# Patient Record
Sex: FEMALE | Race: BLACK OR AFRICAN AMERICAN | NOT HISPANIC OR LATINO | Employment: UNEMPLOYED | URBAN - METROPOLITAN AREA
[De-identification: names, ages, dates, MRNs, and addresses within clinical notes are randomized per-mention and may not be internally consistent; named-entity substitution may affect disease eponyms.]

---

## 2023-12-26 ENCOUNTER — ANESTHESIA EVENT (OUTPATIENT)
Dept: SURGERY | Facility: CLINIC | Age: 28
DRG: 982 | End: 2023-12-26

## 2023-12-26 ENCOUNTER — ANESTHESIA (OUTPATIENT)
Dept: SURGERY | Facility: CLINIC | Age: 28
DRG: 982 | End: 2023-12-26

## 2023-12-26 ENCOUNTER — MEDICAL CORRESPONDENCE (OUTPATIENT)
Dept: HEALTH INFORMATION MANAGEMENT | Facility: CLINIC | Age: 28
End: 2023-12-26

## 2023-12-26 ENCOUNTER — HOSPITAL ENCOUNTER (INPATIENT)
Facility: CLINIC | Age: 28
LOS: 5 days | Discharge: HOME HEALTH | DRG: 982 | End: 2023-12-31
Attending: EMERGENCY MEDICINE | Admitting: HOSPITALIST

## 2023-12-26 ENCOUNTER — APPOINTMENT (OUTPATIENT)
Dept: GENERAL RADIOLOGY | Facility: CLINIC | Age: 28
DRG: 982 | End: 2023-12-26
Attending: PHYSICIAN ASSISTANT

## 2023-12-26 DIAGNOSIS — L02.519 HAND ABSCESS: ICD-10-CM

## 2023-12-26 LAB
ANION GAP SERPL CALCULATED.3IONS-SCNC: 12 MMOL/L (ref 7–15)
ANION GAP SERPL CALCULATED.3IONS-SCNC: 12 MMOL/L (ref 7–15)
ATRIAL RATE - MUSE: 67 BPM
BASOPHILS # BLD AUTO: 0 10E3/UL (ref 0–0.2)
BASOPHILS # BLD AUTO: 0 10E3/UL (ref 0–0.2)
BASOPHILS NFR BLD AUTO: 0 %
BASOPHILS NFR BLD AUTO: 0 %
BUN SERPL-MCNC: 10.2 MG/DL (ref 6–20)
BUN SERPL-MCNC: 8.5 MG/DL (ref 6–20)
CALCIUM SERPL-MCNC: 8.8 MG/DL (ref 8.6–10)
CALCIUM SERPL-MCNC: 9.5 MG/DL (ref 8.6–10)
CHLORIDE SERPL-SCNC: 104 MMOL/L (ref 98–107)
CHLORIDE SERPL-SCNC: 108 MMOL/L (ref 98–107)
CREAT SERPL-MCNC: 0.53 MG/DL (ref 0.51–0.95)
CREAT SERPL-MCNC: 0.56 MG/DL (ref 0.51–0.95)
CRP SERPL-MCNC: 60.3 MG/L
DEPRECATED HCO3 PLAS-SCNC: 21 MMOL/L (ref 22–29)
DEPRECATED HCO3 PLAS-SCNC: 22 MMOL/L (ref 22–29)
DIASTOLIC BLOOD PRESSURE - MUSE: NORMAL MMHG
EGFRCR SERPLBLD CKD-EPI 2021: >90 ML/MIN/1.73M2
EGFRCR SERPLBLD CKD-EPI 2021: >90 ML/MIN/1.73M2
EOSINOPHIL # BLD AUTO: 0 10E3/UL (ref 0–0.7)
EOSINOPHIL # BLD AUTO: 0 10E3/UL (ref 0–0.7)
EOSINOPHIL NFR BLD AUTO: 0 %
EOSINOPHIL NFR BLD AUTO: 0 %
ERYTHROCYTE [DISTWIDTH] IN BLOOD BY AUTOMATED COUNT: 13.2 % (ref 10–15)
ERYTHROCYTE [DISTWIDTH] IN BLOOD BY AUTOMATED COUNT: 13.2 % (ref 10–15)
ERYTHROCYTE [SEDIMENTATION RATE] IN BLOOD BY WESTERGREN METHOD: 6 MM/HR (ref 0–20)
GLUCOSE SERPL-MCNC: 102 MG/DL (ref 70–99)
GLUCOSE SERPL-MCNC: 91 MG/DL (ref 70–99)
HCG SERPL QL: NEGATIVE
HCT VFR BLD AUTO: 39.4 % (ref 35–47)
HCT VFR BLD AUTO: 42.2 % (ref 35–47)
HGB BLD-MCNC: 13.2 G/DL (ref 11.7–15.7)
HGB BLD-MCNC: 14.2 G/DL (ref 11.7–15.7)
IMM GRANULOCYTES # BLD: 0 10E3/UL
IMM GRANULOCYTES # BLD: 0 10E3/UL
IMM GRANULOCYTES NFR BLD: 0 %
IMM GRANULOCYTES NFR BLD: 0 %
INTERPRETATION ECG - MUSE: NORMAL
LYMPHOCYTES # BLD AUTO: 2.1 10E3/UL (ref 0.8–5.3)
LYMPHOCYTES # BLD AUTO: 2.8 10E3/UL (ref 0.8–5.3)
LYMPHOCYTES NFR BLD AUTO: 25 %
LYMPHOCYTES NFR BLD AUTO: 29 %
MCH RBC QN AUTO: 30 PG (ref 26.5–33)
MCH RBC QN AUTO: 30.3 PG (ref 26.5–33)
MCHC RBC AUTO-ENTMCNC: 33.5 G/DL (ref 31.5–36.5)
MCHC RBC AUTO-ENTMCNC: 33.6 G/DL (ref 31.5–36.5)
MCV RBC AUTO: 89 FL (ref 78–100)
MCV RBC AUTO: 90 FL (ref 78–100)
MONOCYTES # BLD AUTO: 0.5 10E3/UL (ref 0–1.3)
MONOCYTES # BLD AUTO: 0.7 10E3/UL (ref 0–1.3)
MONOCYTES NFR BLD AUTO: 6 %
MONOCYTES NFR BLD AUTO: 7 %
NEUTROPHILS # BLD AUTO: 5.7 10E3/UL (ref 1.6–8.3)
NEUTROPHILS # BLD AUTO: 6.2 10E3/UL (ref 1.6–8.3)
NEUTROPHILS NFR BLD AUTO: 64 %
NEUTROPHILS NFR BLD AUTO: 69 %
NRBC # BLD AUTO: 0 10E3/UL
NRBC # BLD AUTO: 0 10E3/UL
NRBC BLD AUTO-RTO: 0 /100
NRBC BLD AUTO-RTO: 0 /100
P AXIS - MUSE: 75 DEGREES
PLATELET # BLD AUTO: 236 10E3/UL (ref 150–450)
PLATELET # BLD AUTO: 281 10E3/UL (ref 150–450)
POTASSIUM SERPL-SCNC: 3.4 MMOL/L (ref 3.4–5.3)
POTASSIUM SERPL-SCNC: 3.9 MMOL/L (ref 3.4–5.3)
PR INTERVAL - MUSE: 152 MS
QRS DURATION - MUSE: 92 MS
QT - MUSE: 410 MS
QTC - MUSE: 433 MS
R AXIS - MUSE: 51 DEGREES
RBC # BLD AUTO: 4.36 10E6/UL (ref 3.8–5.2)
RBC # BLD AUTO: 4.73 10E6/UL (ref 3.8–5.2)
SODIUM SERPL-SCNC: 138 MMOL/L (ref 135–145)
SODIUM SERPL-SCNC: 141 MMOL/L (ref 135–145)
SYSTOLIC BLOOD PRESSURE - MUSE: NORMAL MMHG
T AXIS - MUSE: 53 DEGREES
VENTRICULAR RATE- MUSE: 67 BPM
WBC # BLD AUTO: 8.4 10E3/UL (ref 4–11)
WBC # BLD AUTO: 9.7 10E3/UL (ref 4–11)

## 2023-12-26 PROCEDURE — 258N000003 HC RX IP 258 OP 636: Performed by: HOSPITALIST

## 2023-12-26 PROCEDURE — 258N000003 HC RX IP 258 OP 636: Performed by: EMERGENCY MEDICINE

## 2023-12-26 PROCEDURE — 96365 THER/PROPH/DIAG IV INF INIT: CPT

## 2023-12-26 PROCEDURE — 710N000009 HC RECOVERY PHASE 1, LEVEL 1, PER MIN: Performed by: ORTHOPAEDIC SURGERY

## 2023-12-26 PROCEDURE — 250N000011 HC RX IP 250 OP 636: Performed by: INTERNAL MEDICINE

## 2023-12-26 PROCEDURE — 250N000025 HC SEVOFLURANE, PER MIN: Performed by: ORTHOPAEDIC SURGERY

## 2023-12-26 PROCEDURE — 250N000009 HC RX 250: Performed by: NURSE ANESTHETIST, CERTIFIED REGISTERED

## 2023-12-26 PROCEDURE — 85025 COMPLETE CBC W/AUTO DIFF WBC: CPT | Performed by: HOSPITALIST

## 2023-12-26 PROCEDURE — 250N000013 HC RX MED GY IP 250 OP 250 PS 637: Performed by: PHYSICIAN ASSISTANT

## 2023-12-26 PROCEDURE — 250N000011 HC RX IP 250 OP 636: Performed by: ORTHOPAEDIC SURGERY

## 2023-12-26 PROCEDURE — 36415 COLL VENOUS BLD VENIPUNCTURE: CPT | Performed by: PHYSICIAN ASSISTANT

## 2023-12-26 PROCEDURE — 250N000011 HC RX IP 250 OP 636: Performed by: EMERGENCY MEDICINE

## 2023-12-26 PROCEDURE — 250N000013 HC RX MED GY IP 250 OP 250 PS 637: Performed by: HOSPITALIST

## 2023-12-26 PROCEDURE — 80048 BASIC METABOLIC PNL TOTAL CA: CPT | Performed by: EMERGENCY MEDICINE

## 2023-12-26 PROCEDURE — 86140 C-REACTIVE PROTEIN: CPT | Performed by: PHYSICIAN ASSISTANT

## 2023-12-26 PROCEDURE — 258N000003 HC RX IP 258 OP 636: Performed by: NURSE ANESTHETIST, CERTIFIED REGISTERED

## 2023-12-26 PROCEDURE — 87070 CULTURE OTHR SPECIMN AEROBIC: CPT | Performed by: ORTHOPAEDIC SURGERY

## 2023-12-26 PROCEDURE — 250N000009 HC RX 250: Performed by: ORTHOPAEDIC SURGERY

## 2023-12-26 PROCEDURE — 0R9W00Z DRAINAGE OF RIGHT FINGER PHALANGEAL JOINT WITH DRAINAGE DEVICE, OPEN APPROACH: ICD-10-PCS | Performed by: ORTHOPAEDIC SURGERY

## 2023-12-26 PROCEDURE — 36415 COLL VENOUS BLD VENIPUNCTURE: CPT | Performed by: HOSPITALIST

## 2023-12-26 PROCEDURE — 87102 FUNGUS ISOLATION CULTURE: CPT | Performed by: ORTHOPAEDIC SURGERY

## 2023-12-26 PROCEDURE — 120N000001 HC R&B MED SURG/OB

## 2023-12-26 PROCEDURE — 99285 EMERGENCY DEPT VISIT HI MDM: CPT | Mod: 25

## 2023-12-26 PROCEDURE — 999N000141 HC STATISTIC PRE-PROCEDURE NURSING ASSESSMENT: Performed by: ORTHOPAEDIC SURGERY

## 2023-12-26 PROCEDURE — 0M9700Z DRAINAGE OF RIGHT HAND BURSA AND LIGAMENT WITH DRAINAGE DEVICE, OPEN APPROACH: ICD-10-PCS | Performed by: ORTHOPAEDIC SURGERY

## 2023-12-26 PROCEDURE — 370N000017 HC ANESTHESIA TECHNICAL FEE, PER MIN: Performed by: ORTHOPAEDIC SURGERY

## 2023-12-26 PROCEDURE — 360N000076 HC SURGERY LEVEL 3, PER MIN: Performed by: ORTHOPAEDIC SURGERY

## 2023-12-26 PROCEDURE — 80048 BASIC METABOLIC PNL TOTAL CA: CPT | Performed by: HOSPITALIST

## 2023-12-26 PROCEDURE — 258N000003 HC RX IP 258 OP 636: Performed by: PHYSICIAN ASSISTANT

## 2023-12-26 PROCEDURE — 250N000011 HC RX IP 250 OP 636: Performed by: HOSPITALIST

## 2023-12-26 PROCEDURE — 250N000011 HC RX IP 250 OP 636: Performed by: NURSE ANESTHETIST, CERTIFIED REGISTERED

## 2023-12-26 PROCEDURE — 250N000013 HC RX MED GY IP 250 OP 250 PS 637: Performed by: EMERGENCY MEDICINE

## 2023-12-26 PROCEDURE — 258N000003 HC RX IP 258 OP 636: Performed by: INTERNAL MEDICINE

## 2023-12-26 PROCEDURE — 96375 TX/PRO/DX INJ NEW DRUG ADDON: CPT

## 2023-12-26 PROCEDURE — C9113 INJ PANTOPRAZOLE SODIUM, VIA: HCPCS | Performed by: HOSPITALIST

## 2023-12-26 PROCEDURE — 99222 1ST HOSP IP/OBS MODERATE 55: CPT | Performed by: HOSPITALIST

## 2023-12-26 PROCEDURE — 87075 CULTR BACTERIA EXCEPT BLOOD: CPT | Performed by: ORTHOPAEDIC SURGERY

## 2023-12-26 PROCEDURE — 999N000128 HC STATISTIC PERIPHERAL IV START W/O US GUIDANCE

## 2023-12-26 PROCEDURE — 84703 CHORIONIC GONADOTROPIN ASSAY: CPT | Performed by: EMERGENCY MEDICINE

## 2023-12-26 PROCEDURE — 99254 IP/OBS CNSLTJ NEW/EST MOD 60: CPT | Performed by: INTERNAL MEDICINE

## 2023-12-26 PROCEDURE — 0R9U00Z DRAINAGE OF RIGHT METACARPOPHALANGEAL JOINT WITH DRAINAGE DEVICE, OPEN APPROACH: ICD-10-PCS | Performed by: ORTHOPAEDIC SURGERY

## 2023-12-26 PROCEDURE — 73130 X-RAY EXAM OF HAND: CPT | Mod: RT

## 2023-12-26 PROCEDURE — 272N000001 HC OR GENERAL SUPPLY STERILE: Performed by: ORTHOPAEDIC SURGERY

## 2023-12-26 PROCEDURE — 85652 RBC SED RATE AUTOMATED: CPT | Performed by: PHYSICIAN ASSISTANT

## 2023-12-26 PROCEDURE — 87040 BLOOD CULTURE FOR BACTERIA: CPT | Performed by: PHYSICIAN ASSISTANT

## 2023-12-26 PROCEDURE — 36415 COLL VENOUS BLD VENIPUNCTURE: CPT | Performed by: EMERGENCY MEDICINE

## 2023-12-26 PROCEDURE — 85004 AUTOMATED DIFF WBC COUNT: CPT | Performed by: EMERGENCY MEDICINE

## 2023-12-26 PROCEDURE — 93005 ELECTROCARDIOGRAM TRACING: CPT

## 2023-12-26 RX ORDER — BUPIVACAINE HYDROCHLORIDE AND EPINEPHRINE 5; 5 MG/ML; UG/ML
INJECTION, SOLUTION PERINEURAL PRN
Status: DISCONTINUED | OUTPATIENT
Start: 2023-12-26 | End: 2023-12-26 | Stop reason: HOSPADM

## 2023-12-26 RX ORDER — NALOXONE HYDROCHLORIDE 0.4 MG/ML
0.4 INJECTION, SOLUTION INTRAMUSCULAR; INTRAVENOUS; SUBCUTANEOUS
Status: DISCONTINUED | OUTPATIENT
Start: 2023-12-26 | End: 2023-12-31 | Stop reason: HOSPADM

## 2023-12-26 RX ORDER — BISACODYL 10 MG
10 SUPPOSITORY, RECTAL RECTAL DAILY PRN
Status: DISCONTINUED | OUTPATIENT
Start: 2023-12-26 | End: 2023-12-31 | Stop reason: HOSPADM

## 2023-12-26 RX ORDER — CALCIUM CARBONATE 500 MG/1
1000 TABLET, CHEWABLE ORAL 4 TIMES DAILY PRN
Status: DISCONTINUED | OUTPATIENT
Start: 2023-12-26 | End: 2023-12-31 | Stop reason: HOSPADM

## 2023-12-26 RX ORDER — CEFAZOLIN SODIUM 1 G/3ML
1 INJECTION, POWDER, FOR SOLUTION INTRAMUSCULAR; INTRAVENOUS EVERY 8 HOURS
Status: CANCELLED | OUTPATIENT
Start: 2023-12-26 | End: 2023-12-27

## 2023-12-26 RX ORDER — NALOXONE HYDROCHLORIDE 0.4 MG/ML
0.2 INJECTION, SOLUTION INTRAMUSCULAR; INTRAVENOUS; SUBCUTANEOUS
Status: DISCONTINUED | OUTPATIENT
Start: 2023-12-26 | End: 2023-12-31 | Stop reason: HOSPADM

## 2023-12-26 RX ORDER — GINSENG 100 MG
CAPSULE ORAL
Status: DISCONTINUED
Start: 2023-12-26 | End: 2023-12-26 | Stop reason: HOSPADM

## 2023-12-26 RX ORDER — ONDANSETRON 4 MG/1
4 TABLET, ORALLY DISINTEGRATING ORAL EVERY 30 MIN PRN
Status: DISCONTINUED | OUTPATIENT
Start: 2023-12-26 | End: 2023-12-26

## 2023-12-26 RX ORDER — PROPOFOL 10 MG/ML
INJECTION, EMULSION INTRAVENOUS PRN
Status: DISCONTINUED | OUTPATIENT
Start: 2023-12-26 | End: 2023-12-26

## 2023-12-26 RX ORDER — POLYETHYLENE GLYCOL 3350 17 G/17G
17 POWDER, FOR SOLUTION ORAL 2 TIMES DAILY PRN
Status: DISCONTINUED | OUTPATIENT
Start: 2023-12-26 | End: 2023-12-31 | Stop reason: HOSPADM

## 2023-12-26 RX ORDER — AMOXICILLIN 250 MG
2 CAPSULE ORAL 2 TIMES DAILY PRN
Status: DISCONTINUED | OUTPATIENT
Start: 2023-12-26 | End: 2023-12-31 | Stop reason: HOSPADM

## 2023-12-26 RX ORDER — ONDANSETRON 2 MG/ML
4 INJECTION INTRAMUSCULAR; INTRAVENOUS EVERY 6 HOURS PRN
Status: DISCONTINUED | OUTPATIENT
Start: 2023-12-26 | End: 2023-12-31 | Stop reason: HOSPADM

## 2023-12-26 RX ORDER — LIDOCAINE 40 MG/G
CREAM TOPICAL
Status: DISCONTINUED | OUTPATIENT
Start: 2023-12-26 | End: 2023-12-31 | Stop reason: HOSPADM

## 2023-12-26 RX ORDER — ONDANSETRON 4 MG/1
4 TABLET, ORALLY DISINTEGRATING ORAL EVERY 6 HOURS PRN
Status: DISCONTINUED | OUTPATIENT
Start: 2023-12-26 | End: 2023-12-26

## 2023-12-26 RX ORDER — SODIUM CHLORIDE, SODIUM LACTATE, POTASSIUM CHLORIDE, CALCIUM CHLORIDE 600; 310; 30; 20 MG/100ML; MG/100ML; MG/100ML; MG/100ML
INJECTION, SOLUTION INTRAVENOUS CONTINUOUS
Status: DISCONTINUED | OUTPATIENT
Start: 2023-12-26 | End: 2023-12-29

## 2023-12-26 RX ORDER — ACETAMINOPHEN 325 MG/1
325-650 TABLET ORAL EVERY 6 HOURS PRN
Status: ON HOLD | COMMUNITY
End: 2023-12-29

## 2023-12-26 RX ORDER — SODIUM CHLORIDE, SODIUM LACTATE, POTASSIUM CHLORIDE, CALCIUM CHLORIDE 600; 310; 30; 20 MG/100ML; MG/100ML; MG/100ML; MG/100ML
INJECTION, SOLUTION INTRAVENOUS CONTINUOUS
Status: DISCONTINUED | OUTPATIENT
Start: 2023-12-26 | End: 2023-12-26

## 2023-12-26 RX ORDER — BUPIVACAINE HYDROCHLORIDE 5 MG/ML
INJECTION, SOLUTION EPIDURAL; INTRACAUDAL
Status: DISCONTINUED
Start: 2023-12-26 | End: 2023-12-26 | Stop reason: HOSPADM

## 2023-12-26 RX ORDER — OXYCODONE HYDROCHLORIDE 5 MG/1
10 TABLET ORAL EVERY 4 HOURS PRN
Status: DISCONTINUED | OUTPATIENT
Start: 2023-12-26 | End: 2023-12-27

## 2023-12-26 RX ORDER — ONDANSETRON 2 MG/ML
INJECTION INTRAMUSCULAR; INTRAVENOUS PRN
Status: DISCONTINUED | OUTPATIENT
Start: 2023-12-26 | End: 2023-12-26

## 2023-12-26 RX ORDER — HYDROMORPHONE HCL IN WATER/PF 6 MG/30 ML
0.2 PATIENT CONTROLLED ANALGESIA SYRINGE INTRAVENOUS EVERY 5 MIN PRN
Status: DISCONTINUED | OUTPATIENT
Start: 2023-12-26 | End: 2023-12-26

## 2023-12-26 RX ORDER — DEXAMETHASONE SODIUM PHOSPHATE 4 MG/ML
INJECTION, SOLUTION INTRA-ARTICULAR; INTRALESIONAL; INTRAMUSCULAR; INTRAVENOUS; SOFT TISSUE PRN
Status: DISCONTINUED | OUTPATIENT
Start: 2023-12-26 | End: 2023-12-26

## 2023-12-26 RX ORDER — AMOXICILLIN 250 MG
1 CAPSULE ORAL 2 TIMES DAILY PRN
Status: DISCONTINUED | OUTPATIENT
Start: 2023-12-26 | End: 2023-12-31 | Stop reason: HOSPADM

## 2023-12-26 RX ORDER — FENTANYL CITRATE 50 UG/ML
INJECTION, SOLUTION INTRAMUSCULAR; INTRAVENOUS PRN
Status: DISCONTINUED | OUTPATIENT
Start: 2023-12-26 | End: 2023-12-26

## 2023-12-26 RX ORDER — HYDROMORPHONE HCL IN WATER/PF 6 MG/30 ML
0.2 PATIENT CONTROLLED ANALGESIA SYRINGE INTRAVENOUS
Status: DISCONTINUED | OUTPATIENT
Start: 2023-12-26 | End: 2023-12-31 | Stop reason: HOSPADM

## 2023-12-26 RX ORDER — PROCHLORPERAZINE MALEATE 10 MG
10 TABLET ORAL EVERY 6 HOURS PRN
Status: DISCONTINUED | OUTPATIENT
Start: 2023-12-26 | End: 2023-12-31 | Stop reason: HOSPADM

## 2023-12-26 RX ORDER — AMOXICILLIN 250 MG
1 CAPSULE ORAL 2 TIMES DAILY
Status: DISCONTINUED | OUTPATIENT
Start: 2023-12-26 | End: 2023-12-31 | Stop reason: HOSPADM

## 2023-12-26 RX ORDER — POLYETHYLENE GLYCOL 3350 17 G/17G
17 POWDER, FOR SOLUTION ORAL DAILY
Status: DISCONTINUED | OUTPATIENT
Start: 2023-12-27 | End: 2023-12-31 | Stop reason: HOSPADM

## 2023-12-26 RX ORDER — ACETAMINOPHEN 650 MG/1
650 SUPPOSITORY RECTAL EVERY 4 HOURS PRN
Status: DISCONTINUED | OUTPATIENT
Start: 2023-12-26 | End: 2023-12-31 | Stop reason: HOSPADM

## 2023-12-26 RX ORDER — EPINEPHRINE 1 MG/ML
INJECTION, SOLUTION INTRAMUSCULAR; SUBCUTANEOUS
Status: DISCONTINUED
Start: 2023-12-26 | End: 2023-12-26 | Stop reason: HOSPADM

## 2023-12-26 RX ORDER — ESOMEPRAZOLE MAGNESIUM 40 MG/1
40 CAPSULE, DELAYED RELEASE ORAL DAILY PRN
Status: ON HOLD | COMMUNITY
End: 2023-12-31

## 2023-12-26 RX ORDER — HYDROMORPHONE HCL IN WATER/PF 6 MG/30 ML
0.4 PATIENT CONTROLLED ANALGESIA SYRINGE INTRAVENOUS
Status: DISCONTINUED | OUTPATIENT
Start: 2023-12-26 | End: 2023-12-31 | Stop reason: HOSPADM

## 2023-12-26 RX ORDER — AMPICILLIN AND SULBACTAM 2; 1 G/1; G/1
3 INJECTION, POWDER, FOR SOLUTION INTRAMUSCULAR; INTRAVENOUS EVERY 6 HOURS
Status: DISCONTINUED | OUTPATIENT
Start: 2023-12-26 | End: 2023-12-29

## 2023-12-26 RX ORDER — SODIUM CHLORIDE 9 MG/ML
INJECTION, SOLUTION INTRAVENOUS CONTINUOUS
Status: DISCONTINUED | OUTPATIENT
Start: 2023-12-26 | End: 2023-12-29

## 2023-12-26 RX ORDER — ASPIRIN 81 MG/1
81 TABLET ORAL 2 TIMES DAILY
Status: DISCONTINUED | OUTPATIENT
Start: 2023-12-27 | End: 2023-12-31 | Stop reason: HOSPADM

## 2023-12-26 RX ORDER — FENTANYL CITRATE 0.05 MG/ML
25 INJECTION, SOLUTION INTRAMUSCULAR; INTRAVENOUS EVERY 5 MIN PRN
Status: DISCONTINUED | OUTPATIENT
Start: 2023-12-26 | End: 2023-12-26

## 2023-12-26 RX ORDER — CEFAZOLIN SODIUM/WATER 2 G/20 ML
2 SYRINGE (ML) INTRAVENOUS
Status: DISCONTINUED | OUTPATIENT
Start: 2023-12-26 | End: 2023-12-26 | Stop reason: HOSPADM

## 2023-12-26 RX ORDER — GINSENG 100 MG
CAPSULE ORAL PRN
Status: DISCONTINUED | OUTPATIENT
Start: 2023-12-26 | End: 2023-12-26 | Stop reason: HOSPADM

## 2023-12-26 RX ORDER — ACETAMINOPHEN 325 MG/1
650 TABLET ORAL EVERY 4 HOURS PRN
Status: DISCONTINUED | OUTPATIENT
Start: 2023-12-26 | End: 2023-12-31 | Stop reason: HOSPADM

## 2023-12-26 RX ORDER — HYDROXYZINE HYDROCHLORIDE 25 MG/1
25 TABLET, FILM COATED ORAL EVERY 6 HOURS PRN
Status: DISCONTINUED | OUTPATIENT
Start: 2023-12-26 | End: 2023-12-31 | Stop reason: HOSPADM

## 2023-12-26 RX ORDER — KETOROLAC TROMETHAMINE 30 MG/ML
INJECTION, SOLUTION INTRAMUSCULAR; INTRAVENOUS PRN
Status: DISCONTINUED | OUTPATIENT
Start: 2023-12-26 | End: 2023-12-26

## 2023-12-26 RX ORDER — LIDOCAINE HYDROCHLORIDE 20 MG/ML
INJECTION, SOLUTION INFILTRATION; PERINEURAL PRN
Status: DISCONTINUED | OUTPATIENT
Start: 2023-12-26 | End: 2023-12-26

## 2023-12-26 RX ORDER — LIDOCAINE 40 MG/G
CREAM TOPICAL
Status: DISCONTINUED | OUTPATIENT
Start: 2023-12-26 | End: 2023-12-29

## 2023-12-26 RX ORDER — ACETAMINOPHEN 500 MG
1000 TABLET ORAL ONCE
Status: COMPLETED | OUTPATIENT
Start: 2023-12-26 | End: 2023-12-26

## 2023-12-26 RX ORDER — MAGNESIUM HYDROXIDE 1200 MG/15ML
LIQUID ORAL PRN
Status: DISCONTINUED | OUTPATIENT
Start: 2023-12-26 | End: 2023-12-26 | Stop reason: HOSPADM

## 2023-12-26 RX ORDER — CEFAZOLIN SODIUM/WATER 2 G/20 ML
2 SYRINGE (ML) INTRAVENOUS SEE ADMIN INSTRUCTIONS
Status: DISCONTINUED | OUTPATIENT
Start: 2023-12-26 | End: 2023-12-26 | Stop reason: HOSPADM

## 2023-12-26 RX ORDER — ONDANSETRON 4 MG/1
4 TABLET, ORALLY DISINTEGRATING ORAL EVERY 6 HOURS PRN
Status: DISCONTINUED | OUTPATIENT
Start: 2023-12-26 | End: 2023-12-31 | Stop reason: HOSPADM

## 2023-12-26 RX ORDER — DIPHENHYDRAMINE HCL 25 MG
25 CAPSULE ORAL EVERY 6 HOURS PRN
Status: DISCONTINUED | OUTPATIENT
Start: 2023-12-26 | End: 2023-12-31 | Stop reason: HOSPADM

## 2023-12-26 RX ORDER — ONDANSETRON 2 MG/ML
4 INJECTION INTRAMUSCULAR; INTRAVENOUS EVERY 30 MIN PRN
Status: DISCONTINUED | OUTPATIENT
Start: 2023-12-26 | End: 2023-12-26

## 2023-12-26 RX ORDER — ONDANSETRON 2 MG/ML
4 INJECTION INTRAMUSCULAR; INTRAVENOUS EVERY 6 HOURS PRN
Status: DISCONTINUED | OUTPATIENT
Start: 2023-12-26 | End: 2023-12-26

## 2023-12-26 RX ORDER — OXYCODONE HYDROCHLORIDE 5 MG/1
5 TABLET ORAL EVERY 4 HOURS PRN
Status: DISCONTINUED | OUTPATIENT
Start: 2023-12-26 | End: 2023-12-27

## 2023-12-26 RX ORDER — HYDROMORPHONE HYDROCHLORIDE 1 MG/ML
0.5 INJECTION, SOLUTION INTRAMUSCULAR; INTRAVENOUS; SUBCUTANEOUS ONCE
Status: COMPLETED | OUTPATIENT
Start: 2023-12-26 | End: 2023-12-26

## 2023-12-26 RX ORDER — SODIUM CHLORIDE, SODIUM LACTATE, POTASSIUM CHLORIDE, CALCIUM CHLORIDE 600; 310; 30; 20 MG/100ML; MG/100ML; MG/100ML; MG/100ML
INJECTION, SOLUTION INTRAVENOUS CONTINUOUS PRN
Status: DISCONTINUED | OUTPATIENT
Start: 2023-12-26 | End: 2023-12-26

## 2023-12-26 RX ORDER — FENTANYL CITRATE 0.05 MG/ML
50 INJECTION, SOLUTION INTRAMUSCULAR; INTRAVENOUS EVERY 5 MIN PRN
Status: DISCONTINUED | OUTPATIENT
Start: 2023-12-26 | End: 2023-12-26

## 2023-12-26 RX ORDER — HYDROMORPHONE HCL IN WATER/PF 6 MG/30 ML
0.4 PATIENT CONTROLLED ANALGESIA SYRINGE INTRAVENOUS EVERY 5 MIN PRN
Status: DISCONTINUED | OUTPATIENT
Start: 2023-12-26 | End: 2023-12-26

## 2023-12-26 RX ADMIN — DOCUSATE SODIUM 50 MG AND SENNOSIDES 8.6 MG 1 TABLET: 8.6; 5 TABLET, FILM COATED ORAL at 23:45

## 2023-12-26 RX ADMIN — HYDROMORPHONE HYDROCHLORIDE 0.4 MG: 0.2 INJECTION, SOLUTION INTRAMUSCULAR; INTRAVENOUS; SUBCUTANEOUS at 13:59

## 2023-12-26 RX ADMIN — FENTANYL CITRATE 100 MCG: 50 INJECTION INTRAMUSCULAR; INTRAVENOUS at 19:23

## 2023-12-26 RX ADMIN — Medication 1 LOZENGE: at 23:13

## 2023-12-26 RX ADMIN — ONDANSETRON 4 MG: 2 INJECTION INTRAMUSCULAR; INTRAVENOUS at 19:57

## 2023-12-26 RX ADMIN — ACETAMINOPHEN 650 MG: 325 TABLET, FILM COATED ORAL at 10:54

## 2023-12-26 RX ADMIN — ACETAMINOPHEN 650 MG: 325 TABLET, FILM COATED ORAL at 16:30

## 2023-12-26 RX ADMIN — KETOROLAC TROMETHAMINE 30 MG: 30 INJECTION, SOLUTION INTRAMUSCULAR at 20:47

## 2023-12-26 RX ADMIN — SODIUM CHLORIDE, POTASSIUM CHLORIDE, SODIUM LACTATE AND CALCIUM CHLORIDE: 600; 310; 30; 20 INJECTION, SOLUTION INTRAVENOUS at 23:34

## 2023-12-26 RX ADMIN — SODIUM CHLORIDE, POTASSIUM CHLORIDE, SODIUM LACTATE AND CALCIUM CHLORIDE: 600; 310; 30; 20 INJECTION, SOLUTION INTRAVENOUS at 19:17

## 2023-12-26 RX ADMIN — VANCOMYCIN HYDROCHLORIDE 1250 MG: 10 INJECTION, POWDER, LYOPHILIZED, FOR SOLUTION INTRAVENOUS at 05:48

## 2023-12-26 RX ADMIN — OXYCODONE HYDROCHLORIDE 5 MG: 5 TABLET ORAL at 05:47

## 2023-12-26 RX ADMIN — SODIUM CHLORIDE: 9 INJECTION, SOLUTION INTRAVENOUS at 10:49

## 2023-12-26 RX ADMIN — DEXAMETHASONE SODIUM PHOSPHATE 4 MG: 4 INJECTION, SOLUTION INTRA-ARTICULAR; INTRALESIONAL; INTRAMUSCULAR; INTRAVENOUS; SOFT TISSUE at 19:23

## 2023-12-26 RX ADMIN — AMPICILLIN SODIUM AND SULBACTAM SODIUM 3 G: 2; 1 INJECTION, POWDER, FOR SOLUTION INTRAMUSCULAR; INTRAVENOUS at 13:58

## 2023-12-26 RX ADMIN — SODIUM CHLORIDE 1000 ML: 9 INJECTION, SOLUTION INTRAVENOUS at 05:21

## 2023-12-26 RX ADMIN — PROPOFOL 30 MG: 10 INJECTION, EMULSION INTRAVENOUS at 19:39

## 2023-12-26 RX ADMIN — SUCCINYLCHOLINE CHLORIDE 80 MG: 20 INJECTION, SOLUTION INTRAMUSCULAR; INTRAVENOUS; PARENTERAL at 19:23

## 2023-12-26 RX ADMIN — HYDROMORPHONE HYDROCHLORIDE 0.5 MG: 1 INJECTION, SOLUTION INTRAMUSCULAR; INTRAVENOUS; SUBCUTANEOUS at 19:56

## 2023-12-26 RX ADMIN — PROPOFOL 150 MCG/KG/MIN: 10 INJECTION, EMULSION INTRAVENOUS at 19:28

## 2023-12-26 RX ADMIN — DIPHENHYDRAMINE HYDROCHLORIDE 25 MG: 25 CAPSULE ORAL at 06:49

## 2023-12-26 RX ADMIN — HYDROMORPHONE HYDROCHLORIDE 0.2 MG: 0.2 INJECTION, SOLUTION INTRAMUSCULAR; INTRAVENOUS; SUBCUTANEOUS at 16:30

## 2023-12-26 RX ADMIN — PANTOPRAZOLE SODIUM 40 MG: 40 INJECTION, POWDER, FOR SOLUTION INTRAVENOUS at 06:48

## 2023-12-26 RX ADMIN — OXYCODONE HYDROCHLORIDE 5 MG: 5 TABLET ORAL at 15:41

## 2023-12-26 RX ADMIN — PROPOFOL 150 MG: 10 INJECTION, EMULSION INTRAVENOUS at 19:23

## 2023-12-26 RX ADMIN — HYDROMORPHONE HYDROCHLORIDE 0.5 MG: 1 INJECTION, SOLUTION INTRAMUSCULAR; INTRAVENOUS; SUBCUTANEOUS at 04:53

## 2023-12-26 RX ADMIN — ACETAMINOPHEN 1000 MG: 500 TABLET, FILM COATED ORAL at 04:52

## 2023-12-26 RX ADMIN — AMPICILLIN SODIUM AND SULBACTAM SODIUM 3 G: 2; 1 INJECTION, POWDER, FOR SOLUTION INTRAMUSCULAR; INTRAVENOUS at 20:19

## 2023-12-26 RX ADMIN — OXYCODONE HYDROCHLORIDE 5 MG: 5 TABLET ORAL at 10:44

## 2023-12-26 RX ADMIN — LIDOCAINE HYDROCHLORIDE 100 MG: 20 INJECTION, SOLUTION INFILTRATION; PERINEURAL at 19:23

## 2023-12-26 RX ADMIN — VANCOMYCIN HYDROCHLORIDE 1250 MG: 10 INJECTION, POWDER, LYOPHILIZED, FOR SOLUTION INTRAVENOUS at 17:46

## 2023-12-26 RX ADMIN — MIDAZOLAM 2 MG: 1 INJECTION INTRAMUSCULAR; INTRAVENOUS at 19:17

## 2023-12-26 RX ADMIN — PROPOFOL 70 MG: 10 INJECTION, EMULSION INTRAVENOUS at 19:42

## 2023-12-26 ASSESSMENT — ACTIVITIES OF DAILY LIVING (ADL)
ADLS_ACUITY_SCORE: 35
ADLS_ACUITY_SCORE: 18
ADLS_ACUITY_SCORE: 35
ADLS_ACUITY_SCORE: 18

## 2023-12-26 NOTE — ED PROVIDER NOTES
"  History     Chief Complaint:  Wound Check       HPI   Shweta Hilton is a 28 year old female who presents with pain, swelling, and redness at the base of the right second, index, finger.  She states that she noted a small area approximately 6 days ago, and it has continued to grow and spread and become more painful.  She has tried taking Tylenol with very minimal relief of her symptoms.  She has not had any fever, but does feel somewhat unwell generally.  She is not aware of any injuries, cuts, splinters, or other inciting factor.  She denies any prior history of this.        Allergies:  No Known Allergies     Medications:    No current outpatient medications on file.      Past Medical History:    Past Medical History:   Diagnosis Date    Eye infection     Peptic ulcer        Past Surgical History:    Past Surgical History:   Procedure Laterality Date     SECTION          Family History:    family history is not on file.    Social History:   reports that she has never smoked. She does not have any smokeless tobacco history on file. She reports that she does not drink alcohol and does not use drugs.  PCP: No primary care provider on file.     Physical Exam   Patient Vitals for the past 24 hrs:   BP Temp Temp src Pulse Resp SpO2 Height Weight   23 0600 112/68 -- -- 75 18 100 % -- --   23 0520 119/73 -- -- 73 16 99 % -- --   23 0500 126/77 -- -- 81 18 100 % -- --   23 0119 128/77 98  F (36.7  C) Temporal 69 16 99 % 1.676 m (5' 6\") 60 kg (132 lb 4.4 oz)        Physical Exam  General:  Alert, nontoxic in appearance  CV:  Appears well perfused  Lungs:  No obvious respiratory distress  Neuro:  Speaking clearly, no slurred speech  MSK:  Swelling, erythema, and pain to base of right 2nd finger that spreads to hand itself.  Minimal involvement of the fingers. There is a central fluctuant area.                  Emergency Department Course     Laboratory:  Labs Ordered and Resulted from Time of " ED Arrival to Time of ED Departure   BASIC METABOLIC PANEL - Normal       Result Value    Sodium 138      Potassium 3.9      Chloride 104      Carbon Dioxide (CO2) 22      Anion Gap 12      Urea Nitrogen 10.2      Creatinine 0.53      GFR Estimate >90      Calcium 9.5      Glucose 91     HCG QUALITATIVE PREGNANCY - Normal    hCG Serum Qualitative Negative     CBC WITH PLATELETS AND DIFFERENTIAL    WBC Count 8.4      RBC Count 4.73      Hemoglobin 14.2      Hematocrit 42.2      MCV 89      MCH 30.0      MCHC 33.6      RDW 13.2      Platelet Count 281      % Neutrophils 69      % Lymphocytes 25      % Monocytes 6      % Eosinophils 0      % Basophils 0      % Immature Granulocytes 0      NRBCs per 100 WBC 0      Absolute Neutrophils 5.7      Absolute Lymphocytes 2.1      Absolute Monocytes 0.5      Absolute Eosinophils 0.0      Absolute Basophils 0.0      Absolute Immature Granulocytes 0.0      Absolute NRBCs 0.0     BASIC METABOLIC PANEL          Emergency Department Course & Assessments:    Interventions:  Medications   oxyCODONE (ROXICODONE) tablet 5 mg (5 mg Oral $Given 12/26/23 0547)   diphenhydrAMINE (BENADRYL) capsule 25 mg (25 mg Oral $Given 12/26/23 0649)   acetaminophen (TYLENOL) tablet 1,000 mg (1,000 mg Oral $Given 12/26/23 0452)   HYDROmorphone (PF) (DILAUDID) injection 0.5 mg (0.5 mg Intravenous $Given 12/26/23 0453)   vancomycin (VANCOCIN) 1,250 mg in 0.9% NaCl 250 mL intermittent infusion (0 mg Intravenous Stopped 12/26/23 0609)   sodium chloride 0.9% BOLUS 1,000 mL (0 mLs Intravenous Stopped 12/26/23 0552)   pantoprazole (PROTONIX) IV push injection 40 mg (40 mg Intravenous $Given 12/26/23 0648)        Consultations/Discussion of Management or Tests:  Dr. Page, hospital service, for admission    Social Determinants of Health affecting care:   None    Disposition:  The patient was admitted to the hospital under the care of Dr. Page.     Impression & Plan    Medical Decision Making:  Shweta MUNOZ  Yobani is a 28 year old female presents due to pain, swelling, and redness to her right hand.  My evaluation, she has what appears to be a clear abscess at the base of the second finger of the right hand with associated cellulitis and swelling.  Given its location, and large size, patient was given IV antibiotics and admitted to the hospitalist service with the plan for orthopedic consultation.      Diagnosis:    ICD-10-CM    1. Hand abscess  L02.519               Selvin Montemayor MD  12/26/23 0653

## 2023-12-26 NOTE — ED NOTES
Pt's rt hand soaked in warm water with Hibiclens for 20 minutes and redness outlined with marker.

## 2023-12-26 NOTE — PROGRESS NOTES
Admission    Patient arrives to room 628 via cart from ED.  Care plan note: initiated     Inpatient nursing criteria listed below were met:    Did you put disposition on whiteboard and in sticky note: Yes  Full skin assessment done (add LDA if skin issue present). Initials of 2nd RN :Yes OO  Isolation education started/completed NA  Patient allergies verified with patient: Yes  Fall Risk? (Care plan updated, Education given and documented) No  Primary Care Plan initiated: Yes  Home medications documented in belongings flowsheet: NA  Patient belongings documented in belongings flowsheet: Yes  Reminder note (belongings/ medications) placed in discharge instructions:NA  Admission profile/ required documentation complete: Yes  If patient is a 72 hour hold/Commitment are belongings removed from room and locked up? NA

## 2023-12-26 NOTE — SIGNIFICANT EVENT
Significant Event Note    Time of event: 6:24 AM December 26, 2023    Description of event:  Paged for itching. She has just received Oxycodone and Dilaudid. Vancomycin had been running as well, for about 20 minutes. That was held as a precaution by her nurse, who notes that Ms. Hilton has no evident concomitant pruritus, dyspnea, wheezing, new erythema or rash, or any signs of angioedema.    Plan:  Pruritus could be due to histamine release from narcotics. Benadryl ordered. I recommended carefully to resume Vancomycin, but also to continue to monitor very closely and if any of above concomitant signs develop, to stop the antibiotic immediately and let us know.    Discussed with: bedside nurse    Norman Page MD

## 2023-12-26 NOTE — ANESTHESIA PREPROCEDURE EVALUATION
Anesthesia Pre-Procedure Evaluation    Patient: Shweta Hilton   MRN: [id number] : 1995        Procedure : Procedure(s):  Right Index Finger Irrigation and debridement          Past Medical History:   Diagnosis Date    Eye infection     Peptic ulcer     Otherwise unspecified      Past Surgical History:   Procedure Laterality Date     SECTION        Allergies   Allergen Reactions    Vancomycin      *tolerated slower infusion* 23, reported itching following starting of IV infusion      Social History     Tobacco Use    Smoking status: Never    Smokeless tobacco: Not on file   Substance Use Topics    Alcohol use: Never      Wt Readings from Last 1 Encounters:   23 58.8 kg (129 lb 10.1 oz)        Anesthesia Evaluation   Pt has had prior anesthetic.     No history of anesthetic complications       ROS/MED HX  ENT/Pulmonary:    (-) sleep apnea   Neurologic:       Cardiovascular:       METS/Exercise Tolerance:     Hematologic:       Musculoskeletal: Comment: Left hand cellulitis      GI/Hepatic:    (-) GERD   Renal/Genitourinary:       Endo:       Psychiatric/Substance Use:       Infectious Disease: Comment: Cellulitis hand      Malignancy:       Other:            Physical Exam    Airway        Mallampati: I   TM distance: > 3 FB   Neck ROM: full   Mouth opening: > 3 cm    Respiratory Devices and Support         Dental       (+) Minor Abnormalities - some fillings, tiny chips      Cardiovascular   cardiovascular exam normal          Pulmonary   pulmonary exam normal                OUTSIDE LABS:  CBC:   Lab Results   Component Value Date    WBC 9.7 2023    WBC 8.4 2023    HGB 13.2 2023    HGB 14.2 2023    HCT 39.4 2023    HCT 42.2 2023     2023     2023     BMP:   Lab Results   Component Value Date     2023     2023    POTASSIUM 3.4 2023    POTASSIUM 3.9 2023    CHLORIDE 108 (H) 2023     "CHLORIDE 104 12/26/2023    CO2 21 (L) 12/26/2023    CO2 22 12/26/2023    BUN 8.5 12/26/2023    BUN 10.2 12/26/2023    CR 0.56 12/26/2023    CR 0.53 12/26/2023     (H) 12/26/2023    GLC 91 12/26/2023     COAGS: No results found for: \"PTT\", \"INR\", \"FIBR\"  POC:   Lab Results   Component Value Date    HCGS Negative 12/26/2023     HEPATIC: No results found for: \"ALBUMIN\", \"PROTTOTAL\", \"ALT\", \"AST\", \"GGT\", \"ALKPHOS\", \"BILITOTAL\", \"BILIDIRECT\", \"EVA\"  OTHER:   Lab Results   Component Value Date    SALVADOR 8.8 12/26/2023    SED 6 12/26/2023       Anesthesia Plan    ASA Status:  2    NPO Status:  NPO Appropriate    Anesthesia Type: General.     - Airway: ETT   Induction: Intravenous, RSI.   Maintenance: TIVA.        Consents    Anesthesia Plan(s) and associated risks, benefits, and realistic alternatives discussed. Questions answered and patient/representative(s) expressed understanding.     - Discussed:     - Discussed with:  Patient            Postoperative Care    Pain management: IV analgesics, Multi-modal analgesia.   PONV prophylaxis: Ondansetron (or other 5HT-3), Dexamethasone or Solumedrol     Comments:               Shreyas Isaacs MD    I have reviewed the pertinent notes and labs in the chart from the past 30 days and (re)examined the patient.  Any updates or changes from those notes are reflected in this note.                  "

## 2023-12-26 NOTE — ED NOTES
"Dr. Page returned text page  status given. Received order to administer Benadryl to pt and to resume the Vancomycin.   Pt informed about the  PO Benadryl 25 mg  and to resume the Vancomycin per  Dr. Page. The Benadryl was administered as ordered pt refused Vancomycin gtt resumption and stated \" Antibiotics gives me ulcer, I want to take Nexium first before I let you give me the IV antibiotics\". When asked what dose of the Nexium she takes pt replied \" I do not know\".  Plan: Will notify Dr. Page  "

## 2023-12-26 NOTE — ED NOTES
[facility]  ED Nurse Handoff Report    ED Chief complaint: Wound Check (Proximal right 2nd finger swelling and erythema worsening over the last number of days to area with past pimple. CMS intact to right fingers.)      ED Diagnosis:   Final diagnoses:   Hand abscess       Code Status: Per the admitting Provider     Allergies:   Allergies   Allergen Reactions    Vancomycin      *tolerated slower infusion* 12/26/23, reported itching following starting of IV infusion       Patient Story:  Pt c/o pain, swelling and redness in rt hand onset 6 days ago.        Focused Assessment:   A/O x 4 rates pain level 10/10. Rt hand is  swollen, with a large blister noted on Rt index finger, The 3rd, and Rt ring fingers are also edematous. Pt denied recent trauma to rt hand. + radial pulse,  she is able to wiggle fingers when instructed but c/o increase pain with movement. Rt hand elevated on x 1 pillow.     Treatments and/or interventions provided: Lab, Tylenol, Dilaudid Oxycodone Vancomycin and NS x 1 liter   Results for orders placed or performed during the hospital encounter of 12/26/23   XR Hand Right G/E 3 Views     Status: None    Narrative    EXAM: XR HAND RIGHT G/E 3 VIEWS  DATE/TIME: 12/26/2023 12:37 PM    INDICATION: Right index finger infection, eval for foreign body and  other acute pathology  COMPARISON: None available.       Impression    IMPRESSION: Soft tissue edema about the second digit, most pronounced  at the level of the metacarpophalangeal joint. No acute fracture or  evidence of osteomyelitis.       VANESSA AGUIRRE DO         SYSTEM ID:  [id number]   Basic metabolic panel     Status: Normal   Result Value Ref Range    Sodium 138 135 - 145 mmol/L    Potassium 3.9 3.4 - 5.3 mmol/L    Chloride 104 98 - 107 mmol/L    Carbon Dioxide (CO2) 22 22 - 29 mmol/L    Anion Gap 12 7 - 15 mmol/L    Urea Nitrogen 10.2 6.0 - 20.0 mg/dL    Creatinine 0.53 0.51 - 0.95 mg/dL    GFR Estimate >90 >60 mL/min/1.73m2     Calcium 9.5 8.6 - 10.0 mg/dL    Glucose 91 70 - 99 mg/dL   HCG qualitative Blood     Status: Normal   Result Value Ref Range    hCG Serum Qualitative Negative Negative   CBC with platelets and differential     Status: None   Result Value Ref Range    WBC Count 8.4 4.0 - 11.0 10e3/uL    RBC Count 4.73 3.80 - 5.20 10e6/uL    Hemoglobin 14.2 11.7 - 15.7 g/dL    Hematocrit 42.2 35.0 - 47.0 %    MCV 89 78 - 100 fL    MCH 30.0 26.5 - 33.0 pg    MCHC 33.6 31.5 - 36.5 g/dL    RDW 13.2 10.0 - 15.0 %    Platelet Count 281 150 - 450 10e3/uL    % Neutrophils 69 %    % Lymphocytes 25 %    % Monocytes 6 %    % Eosinophils 0 %    % Basophils 0 %    % Immature Granulocytes 0 %    NRBCs per 100 WBC 0 <1 /100    Absolute Neutrophils 5.7 1.6 - 8.3 10e3/uL    Absolute Lymphocytes 2.1 0.8 - 5.3 10e3/uL    Absolute Monocytes 0.5 0.0 - 1.3 10e3/uL    Absolute Eosinophils 0.0 0.0 - 0.7 10e3/uL    Absolute Basophils 0.0 0.0 - 0.2 10e3/uL    Absolute Immature Granulocytes 0.0 <=0.4 10e3/uL    Absolute NRBCs 0.0 10e3/uL   Basic metabolic panel     Status: Abnormal   Result Value Ref Range    Sodium 141 135 - 145 mmol/L    Potassium 3.4 3.4 - 5.3 mmol/L    Chloride 108 (H) 98 - 107 mmol/L    Carbon Dioxide (CO2) 21 (L) 22 - 29 mmol/L    Anion Gap 12 7 - 15 mmol/L    Urea Nitrogen 8.5 6.0 - 20.0 mg/dL    Creatinine 0.56 0.51 - 0.95 mg/dL    GFR Estimate >90 >60 mL/min/1.73m2    Calcium 8.8 8.6 - 10.0 mg/dL    Glucose 102 (H) 70 - 99 mg/dL   CBC with platelets and differential     Status: None   Result Value Ref Range    WBC Count 9.7 4.0 - 11.0 10e3/uL    RBC Count 4.36 3.80 - 5.20 10e6/uL    Hemoglobin 13.2 11.7 - 15.7 g/dL    Hematocrit 39.4 35.0 - 47.0 %    MCV 90 78 - 100 fL    MCH 30.3 26.5 - 33.0 pg    MCHC 33.5 31.5 - 36.5 g/dL    RDW 13.2 10.0 - 15.0 %    Platelet Count 236 150 - 450 10e3/uL    % Neutrophils 64 %    % Lymphocytes 29 %    % Monocytes 7 %    % Eosinophils 0 %    % Basophils 0 %    % Immature Granulocytes 0 %    NRBCs  per 100 WBC 0 <1 /100    Absolute Neutrophils 6.2 1.6 - 8.3 10e3/uL    Absolute Lymphocytes 2.8 0.8 - 5.3 10e3/uL    Absolute Monocytes 0.7 0.0 - 1.3 10e3/uL    Absolute Eosinophils 0.0 0.0 - 0.7 10e3/uL    Absolute Basophils 0.0 0.0 - 0.2 10e3/uL    Absolute Immature Granulocytes 0.0 <=0.4 10e3/uL    Absolute NRBCs 0.0 10e3/uL   CRP inflammation     Status: Abnormal   Result Value Ref Range    CRP Inflammation 60.30 (H) <5.00 mg/L   Erythrocyte sedimentation rate auto     Status: Normal   Result Value Ref Range    Erythrocyte Sedimentation Rate 6 0 - 20 mm/hr   EKG 12 lead     Status: None   Result Value Ref Range    Systolic Blood Pressure  mmHg    Diastolic Blood Pressure  mmHg    Ventricular Rate 67 BPM    Atrial Rate 67 BPM    GA Interval 152 ms    QRS Duration 92 ms     ms    QTc 433 ms    P Axis 75 degrees    R AXIS 51 degrees    T Axis 53 degrees    Interpretation ECG       Sinus rhythm  Possible Left atrial enlargement  Borderline ECG  No previous ECGs available  Confirmed by GENERATED REPORT, COMPUTER ([id number]),  Leticia Barber ([id number]) on 12/26/2023 11:36:02 AM     CBC with platelets differential     Status: None    Narrative    The following orders were created for panel order CBC with platelets differential.  Procedure                               Abnormality         Status                     ---------                               -----------         ------                     CBC with platelets and d...[[id number]]                      Final result                 Please view results for these tests on the individual orders.   CBC with platelets differential     Status: None    Narrative    The following orders were created for panel order CBC with platelets differential.  Procedure                               Abnormality         Status                     ---------                               -----------         ------                     CBC with platelets and d...[[id number]]                       Final result                 Please view results for these tests on the individual orders.        Patient's response to treatments and/or interventions: Stable     To be done/followed up on inpatient unit:  See orders     Does this patient have any cognitive concerns?:  no     Activity level - Baseline/Home:  Independent  Activity Level - Current:   Independent    Patient's Preferred language: English   Needed?: No    Isolation: None  Infection: Not Applicable  Patient tested for COVID 19 prior to admission: YES  Bariatric?: No    Vital Signs:   Vitals:    12/26/23 0930 12/26/23 0945 12/26/23 1000 12/26/23 1015   BP:       Pulse:       Resp:       Temp:       TempSrc:       SpO2: 100% 99% 100% 100%   Weight:       Height:           Cardiac Rhythm:     Was the PSS-3 completed:   Yes  What interventions are required if any?               Family Comments:  is at the bedside   OBS brochure/video discussed/provided to patient/family: Yes              Name of person given brochure if not patient: N/A               Relationship to patient: N/A     For the majority of the shift this patient's behavior was Green.   Behavioral interventions performed were N/A .    ED NURSE PHONE NUMBER: *[id number]

## 2023-12-26 NOTE — CONSULTS
[facility]    Infectious Disease Consultation     Date of Admission:  2023  Date of Consult (When I saw the patient): 23    Assessment & Plan   Shweta Hilton is a 28 year old who was admitted on 2023.     Impression:  27 yo female coming in with pain redness and swelling on the right hand that started 7 days ago and progressively worsening   No known trauma   Plan for I and D   On vancomycin     Recommendations:   Continue on vancomycin   Add unasyn   Will follow up on Ortho operative plan     Faustina Perez MD    Reason for Consult   Reason for consult: I was asked to evaluate this patient for right hand cellulitis and abscesses.    Primary Care Physician   Physician No Ref-Primary    Chief Complaint   Right hand infection     History is obtained from the patient and medical records    History of Present Illness   Shweta Hilton is a 28 year old female who recently arrived from [facility] [place] the [place] now presenting with a week long on pain redness and swelling on the right hand     Past Medical History   I have reviewed this patient's medical history and updated it with pertinent information if needed.   Past Medical History:   Diagnosis Date    Eye infection     Peptic ulcer     Otherwise unspecified       Past Surgical History   I have reviewed this patient's surgical history and updated it with pertinent information if needed.  Past Surgical History:   Procedure Laterality Date     SECTION         Prior to Admission Medications   Prior to Admission Medications   Prescriptions Last Dose Informant Patient Reported? Taking?   Biotin w/ Vitamins C & E (HAIR SKIN & NAILS GUMMIES PO) 2023  Yes Yes   Sig: Take 1 chew tab by mouth daily   acetaminophen (TYLENOL) 325 MG tablet Unknown at prn  Yes Yes   Sig: Take 325-650 mg by mouth every 6 hours as needed for mild pain or fever   esomeprazole (NEXIUM) 40 MG DR capsule Unknown at prn  Yes Yes   Sig: Take 40 mg by mouth  "daily as needed      Facility-Administered Medications: None     Allergies   Allergies   Allergen Reactions    Vancomycin      *tolerated slower infusion* 12/26/23, reported itching following starting of IV infusion       Immunization History     There is no immunization history on file for this patient.    Social History   I have reviewed this patient's social history and updated it with pertinent information if needed. Shweta Hilton  reports that she has never smoked. She does not have any smokeless tobacco history on file. She reports that she does not drink alcohol and does not use drugs.    Family History   I have reviewed this patient's family history and updated it with pertinent information if needed.   No family history on file.    Review of Systems   The 10 point Review of Systems is negative    Physical Exam   Temp: 98  F (36.7  C) Temp src: Temporal BP: 112/68 Pulse: 75   Resp: 18 SpO2: 100 % O2 Device: None (Room air)    Vital Signs with Ranges  Temp:  [98  F (36.7  C)] 98  F (36.7  C)  Pulse:  [69-81] 75  Resp:  [16-18] 18  BP: (112-128)/(68-77) 112/68  SpO2:  [95 %-100 %] 100 %  132 lbs 4.42 oz  Body mass index is 21.35 kg/m .    GENERAL APPEARANCE:  awake  EYES: Eyes grossly normal to inspection  NECK: no adenopathy  RESP: lungs clear   CV: regular rates and rhythm  LYMPHATICS: normal ant/post cervical and supraclavicular nodes  ABDOMEN: soft, nontender  MS: right hand swollen and red   SKIN: no suspicious lesions or rashes        Data   All laboratory and imaging data in the past 24 hours reviewed  No results for input(s): \"CULT\" in the last 168 hours.  No lab results found.    Invalid input(s): \"UC\"       All cultures:  No results for input(s): \"CULTURE\" in the last 168 hours.   Blood culture:  No results found for this or any previous visit.   Urine culture:  No results found for this or any previous visit.          "

## 2023-12-26 NOTE — CONSULTS
[facility]    Orthopedic Consultation    Shweta Hilton MRN# [id number]   Age: 28 year old YOB: 1995     Date of Admission: 12/26/2023    Reason for consult: Right index finger infection       Requesting physician: Norman Page MD       Level of consult: Consult, follow and place orders           Assessment and Plan:   Assessment:   Right index finger abscess with surrounding cellulitis and probable flexor tenosynovitis      Plan:   The patient's history and clinical/diagnostic findings were reviewed with the on-call orthopedic hand surgeon, Dr. Martina Edwards. The patient has experienced pain, redness, and swelling to the right hand, particularly the index finger and over the dorsal and palmar aspects, for the past 6-7 days. No inciting event to her knowledge including trauma, puncture, insect bite, animal bite, or laceration. She did recently relocate to [place] from [facility] 25 days ago and has been feeling unwell since before she moved to the [facility] with rash, fatigue, nausea, and polymyalgias. Labs: WBC 9.7, ESR 6, and CRP 60.30. Right hand radiographs ordered and pending. On exam, the patient has right index finger erythema, swelling, and pain with a pustule on the volar aspect of the second metacarpal head region. She has increased pain with attempts at active and passive flexion of the right index MCP and PIP joints. Patient also notes paresthesias to the palmar aspect of her hand, more so at the hypothenar eminence but also into the right long finger. Patient received less than a half dose of vancomycin in the ED which she requested to be discontinued as she felt short of breath. Currently, VSS and afebrile. Discussed above findings with Dr. Edwards. She will discuss plans and surgery with the patient later today in preop while obtaining surgical consent. Plan as follows:    -Admit to medicine team.  -Plan for right index finger/hand I&D this evening.   -Patient to  remain NPO until postop.  -Infectious disease consult.  -Patient to undergo right hand Hibiclens soak in ED (15 minutes). RN will outline erythema after soak is completed.   -Encourage continued/aggressive right hand elevation.  -Continue pain control regimen.   -Ortho will continue to follow this hospitalization.    Please contact orthopedic trauma team if any questions or concerns arise.           Chief Complaint:   Right index finger infection         History of Present Illness:   Medical history obtained via chart review and discussion with the patient. Shweta Hilton is a very pleasant 28 year old right-hand dominant female with past medical history of peptic ulcer disease and recent left eye infection who was admitted on 12/26/23 for a right index finger infection. Patient reports that she recently moved from [facility] to [place] 25 days ago. She recently was hospitalization while in [facility] with a left eye infection. She notes that for the week prior to moving to [place], she had developed a rash and felt unwell. She has struggled with increased fatigue, intermittent nausea, and muscle/joint pain for the past couple of weeks. Hcg negative in the ED. Then, 1 week ago (12/19/23), the patient developed right index finger pain, swelling, redness, and a blistered area. She notes that it started off as a small blister/abrasion, then gradually worsened. She denies any known trauma, scratches/lacerations, insect bites, animal bites, or punctures to the right hand. Symptoms have been overall stable, or slightly worse, over the past couple of days. Denies drainage. The patient also note intermittent tingling to the ulnar have of the palm and into the right middle finger. She continues to feel fatigued. Denies fever and chills at this time. VSS. Afebrile. Prior to my evaluation, the patient notes that she was given an antibiotic (vancomycin) that made her feel short of breath, dizzy, and caused chest pain. Per her RN, the  dose was stopped before it reached the jail point. Patient notes improvement of symptoms with Tylenol. She is being diligent about elevation and applying ice to her right hand. Patient denies prior injuries or surgeries to the right hand. Last solid PO was 1300 on 23; she has had sips of water in the ED with pills. No prior complications with anesthesia noted.          Past Medical History:     Past Medical History:   Diagnosis Date    Eye infection     Peptic ulcer     Otherwise unspecified             Past Surgical History:     Past Surgical History:   Procedure Laterality Date     SECTION               Social History:     Social History     Tobacco Use    Smoking status: Never    Smokeless tobacco: Not on file   Substance Use Topics    Alcohol use: Never             Family History:   No family history on file.          Immunizations:     VACCINE/DOSE   Diptheria   DPT   DTAP   HBIG   Hepatitis A   Hepatitis B   HIB   Influenza   Measles   Meningococcal   MMR   Mumps   Pneumococcal   Polio   Rubella   Small Pox   TDAP   Varicella   Zoster             Allergies:     Allergies   Allergen Reactions    Vancomycin      *tolerated slower infusion* 23, reported itching following starting of IV infusion             Medications:     Current Facility-Administered Medications   Medication    acetaminophen (TYLENOL) tablet 650 mg    Or    acetaminophen (TYLENOL) Suppository 650 mg    diphenhydrAMINE (BENADRYL) capsule 25 mg    HYDROmorphone (DILAUDID) injection 0.2 mg    HYDROmorphone (DILAUDID) injection 0.4 mg    ondansetron (ZOFRAN ODT) ODT tab 4 mg    Or    ondansetron (ZOFRAN) injection 4 mg    oxyCODONE (ROXICODONE) tablet 10 mg    oxyCODONE (ROXICODONE) tablet 5 mg    sodium chloride 0.9 % infusion     Current Outpatient Medications   Medication Sig    acetaminophen (TYLENOL) 325 MG tablet Take 325-650 mg by mouth every 6 hours as needed for mild pain or fever    Biotin w/ Vitamins C & E (HAIR  "SKIN & NAILS GUMMIES PO) Take 1 chew tab by mouth daily    esomeprazole (NEXIUM) 40 MG DR capsule Take 40 mg by mouth daily as needed             Review of Systems:   CV: NEGATIVE for chest pain, palpitations or peripheral edema  C: NEGATIVE for fever, chills, change in weight  E/M: NEGATIVE for ear, mouth and throat problems  R: NEGATIVE for significant cough or SOB          Physical Exam:   All vitals have been reviewed  Patient Vitals for the past 24 hrs:   BP Temp Temp src Pulse Resp SpO2 Height Weight   12/26/23 1015 -- -- -- -- -- 100 % -- --   12/26/23 1000 -- -- -- -- -- 100 % -- --   12/26/23 0945 -- -- -- -- -- 99 % -- --   12/26/23 0930 -- -- -- -- -- 100 % -- --   12/26/23 0915 -- -- -- -- -- 100 % -- --   12/26/23 0900 -- -- -- -- -- 95 % -- --   12/26/23 0845 -- -- -- -- -- 100 % -- --   12/26/23 0830 -- -- -- -- -- 100 % -- --   12/26/23 0600 112/68 -- -- 75 18 100 % -- --   12/26/23 0520 119/73 -- -- 73 16 99 % -- --   12/26/23 0500 126/77 -- -- 81 18 100 % -- --   12/26/23 0119 128/77 98  F (36.7  C) Temporal 69 16 99 % 1.676 m (5' 6\") 60 kg (132 lb 4.4 oz)     No intake or output data in the 24 hours ending 12/26/23 1201    Constitutional: Pleasant, alert, appropriate, following commands. NAD. Non-toxic appearing. Conversational.   HEENT: Head atraumatic normocephalic. Pupils equal round and reactive.  Respiratory: Unlabored breathing no audible wheeze  Cardiovascular: Regular rate and rhythm per pulses.  GI: Abdomen is non-distended.  Lymph/Hematologic: No lymphadenopathy in areas examined.  Genitourinary: No freeman  Skin: No rashes, no cyanosis, no edema.  Musculoskeletal: Right hand: Pustule over the palmar aspect of the right second metacarpal head region. Underlying purulent material noted. No active drainage. No open wounds. Diffuse erythema of the right index finger over the palmar and dorsal aspects, mostly centralized between the second PIP joint to the palmar/radial half of the hand with " lesser erythema over the dorsal aspect. Associated swelling to the area and dorsal and palmar hand. No apparent lymphangitis proximal to the wrist. Diffuse tender throughout the flexor surface > extensor surface of the right index finger and second metacarpal region. Right thumb is diffusely tender at the MCP joint. Nontender over the wrist circumferentially. Forearm compartments are soft and nontender. Patient able to actively flex and extend the right thumb IP joint, hesitant to range the thumb MCP joint. Significant pain with attempts at active and passive right index finger flexion at the PIP and MCP joints. Able to flex and extend the right index finger DIP joint without significant pain. No significant pain with right wrist ROM. Tinel's at the carpal tunnel reproduces paresthesias to the right long finger. Radial and ulnar pulses 2+. Capillary refill <2 seconds. SILT.  Neurologic: GCS 15, A&OX4, normal mood and affect          Data:   All laboratory data reviewed  Results for orders placed or performed during the hospital encounter of 12/26/23   Basic metabolic panel     Status: Normal   Result Value Ref Range    Sodium 138 135 - 145 mmol/L    Potassium 3.9 3.4 - 5.3 mmol/L    Chloride 104 98 - 107 mmol/L    Carbon Dioxide (CO2) 22 22 - 29 mmol/L    Anion Gap 12 7 - 15 mmol/L    Urea Nitrogen 10.2 6.0 - 20.0 mg/dL    Creatinine 0.53 0.51 - 0.95 mg/dL    GFR Estimate >90 >60 mL/min/1.73m2    Calcium 9.5 8.6 - 10.0 mg/dL    Glucose 91 70 - 99 mg/dL   HCG qualitative Blood     Status: Normal   Result Value Ref Range    hCG Serum Qualitative Negative Negative   CBC with platelets and differential     Status: None   Result Value Ref Range    WBC Count 8.4 4.0 - 11.0 10e3/uL    RBC Count 4.73 3.80 - 5.20 10e6/uL    Hemoglobin 14.2 11.7 - 15.7 g/dL    Hematocrit 42.2 35.0 - 47.0 %    MCV 89 78 - 100 fL    MCH 30.0 26.5 - 33.0 pg    MCHC 33.6 31.5 - 36.5 g/dL    RDW 13.2 10.0 - 15.0 %    Platelet Count 281 150 - 450  10e3/uL    % Neutrophils 69 %    % Lymphocytes 25 %    % Monocytes 6 %    % Eosinophils 0 %    % Basophils 0 %    % Immature Granulocytes 0 %    NRBCs per 100 WBC 0 <1 /100    Absolute Neutrophils 5.7 1.6 - 8.3 10e3/uL    Absolute Lymphocytes 2.1 0.8 - 5.3 10e3/uL    Absolute Monocytes 0.5 0.0 - 1.3 10e3/uL    Absolute Eosinophils 0.0 0.0 - 0.7 10e3/uL    Absolute Basophils 0.0 0.0 - 0.2 10e3/uL    Absolute Immature Granulocytes 0.0 <=0.4 10e3/uL    Absolute NRBCs 0.0 10e3/uL   Basic metabolic panel     Status: Abnormal   Result Value Ref Range    Sodium 141 135 - 145 mmol/L    Potassium 3.4 3.4 - 5.3 mmol/L    Chloride 108 (H) 98 - 107 mmol/L    Carbon Dioxide (CO2) 21 (L) 22 - 29 mmol/L    Anion Gap 12 7 - 15 mmol/L    Urea Nitrogen 8.5 6.0 - 20.0 mg/dL    Creatinine 0.56 0.51 - 0.95 mg/dL    GFR Estimate >90 >60 mL/min/1.73m2    Calcium 8.8 8.6 - 10.0 mg/dL    Glucose 102 (H) 70 - 99 mg/dL   CBC with platelets and differential     Status: None   Result Value Ref Range    WBC Count 9.7 4.0 - 11.0 10e3/uL    RBC Count 4.36 3.80 - 5.20 10e6/uL    Hemoglobin 13.2 11.7 - 15.7 g/dL    Hematocrit 39.4 35.0 - 47.0 %    MCV 90 78 - 100 fL    MCH 30.3 26.5 - 33.0 pg    MCHC 33.5 31.5 - 36.5 g/dL    RDW 13.2 10.0 - 15.0 %    Platelet Count 236 150 - 450 10e3/uL    % Neutrophils 64 %    % Lymphocytes 29 %    % Monocytes 7 %    % Eosinophils 0 %    % Basophils 0 %    % Immature Granulocytes 0 %    NRBCs per 100 WBC 0 <1 /100    Absolute Neutrophils 6.2 1.6 - 8.3 10e3/uL    Absolute Lymphocytes 2.8 0.8 - 5.3 10e3/uL    Absolute Monocytes 0.7 0.0 - 1.3 10e3/uL    Absolute Eosinophils 0.0 0.0 - 0.7 10e3/uL    Absolute Basophils 0.0 0.0 - 0.2 10e3/uL    Absolute Immature Granulocytes 0.0 <=0.4 10e3/uL    Absolute NRBCs 0.0 10e3/uL   CRP inflammation     Status: Abnormal   Result Value Ref Range    CRP Inflammation 60.30 (H) <5.00 mg/L   Erythrocyte sedimentation rate auto     Status: Normal   Result Value Ref Range    Erythrocyte  Sedimentation Rate 6 0 - 20 mm/hr   EKG 12 lead     Status: None   Result Value Ref Range    Systolic Blood Pressure  mmHg    Diastolic Blood Pressure  mmHg    Ventricular Rate 67 BPM    Atrial Rate 67 BPM    FL Interval 152 ms    QRS Duration 92 ms     ms    QTc 433 ms    P Axis 75 degrees    R AXIS 51 degrees    T Axis 53 degrees    Interpretation ECG       Sinus rhythm  Possible Left atrial enlargement  Borderline ECG  No previous ECGs available  Confirmed by GENERATED REPORT, COMPUTER ([id number]),  Leticia Barber ([id number]) on 12/26/2023 11:36:02 AM     CBC with platelets differential     Status: None    Narrative    The following orders were created for panel order CBC with platelets differential.  Procedure                               Abnormality         Status                     ---------                               -----------         ------                     CBC with platelets and d...[[id number]]                      Final result                 Please view results for these tests on the individual orders.   CBC with platelets differential     Status: None    Narrative    The following orders were created for panel order CBC with platelets differential.  Procedure                               Abnormality         Status                     ---------                               -----------         ------                     CBC with platelets and d...[[id number]]                      Final result                 Please view results for these tests on the individual orders.          Attestation:  I have reviewed today's vital signs, notes, medications, labs and imaging with Dr. Martina Edwards.  Amount of time performed on this consult: 60 minutes.    Shelia Marvin PA-C  [facility]

## 2023-12-26 NOTE — PHARMACY-VANCOMYCIN DOSING SERVICE
Pharmacy Vancomycin Initial Note  Date of Service 2023  Patient's  1995  28 year old, female    Indication: Abscess    Current estimated CrCl = Estimated Creatinine Clearance: 142.9 mL/min (based on SCr of 0.56 mg/dL).    Creatinine for last 3 days  2023:  4:45 AM Creatinine 0.53 mg/dL;  7:21 AM Creatinine 0.56 mg/dL    Recent Vancomycin Level(s) for last 3 days  No results found for requested labs within last 3 days.      Vancomycin IV Administrations (past 72 hours)                     vancomycin (VANCOCIN) 1,250 mg in 0.9% NaCl 250 mL intermittent infusion (mg) 1,250 mg New Bag 23 0548                    Nephrotoxins and other renal medications (From now, onward)      Start     Dose/Rate Route Frequency Ordered Stop    23 1800  vancomycin (VANCOCIN) 1,250 mg in 0.9% NaCl 250 mL intermittent infusion         1,250 mg  over 90 Minutes Intravenous EVERY 12 HOURS 23 1344      23 1330  ampicillin-sulbactam (UNASYN) 3 g vial to attach to  mL bag         3 g  over 15-30 Minutes Intravenous EVERY 6 HOURS 23 1313              Contrast Orders - past 72 hours (72h ago, onward)      None            [employer] Prediction of Planned Initial Vancomycin Regimen  Loading dose: N/A  Regimen: 1250 mg IV every 12 hours.  Start time: 17:48 on 2023  Exposure target: AUC24 (range)400-600 mg/L.hr   AUC24,ss: 476 mg/L.hr  Probability of AUC24 > 400: 67 %  Ctrough,ss: 12.6 mg/L  Probability of Ctrough,ss > 20: 20 %  Probability of nephrotoxicity (Lodise FADY ): 8 %          Plan:  Start vancomycin  1250 mg IV q12h.   Vancomycin monitoring method: AUC  Vancomycin therapeutic monitoring goal: 400-600 mg*h/L  Pharmacy will check vancomycin levels as appropriate in 1-3 Days.    Serum creatinine levels will be ordered a minimum of twice weekly.      Kyle Locke [facility]

## 2023-12-26 NOTE — PHARMACY-ADMISSION MEDICATION HISTORY
Pharmacist Admission Medication History    Admission medication history is complete. The information provided in this note is only as accurate as the sources available at the time of the update.    Information Source(s): Patient, Family member, Hospital records, and CareEverywhere/SureScripts via in-person    Pertinent Information: Patient reported itching to Vancomycin infusion. Resolved following stopping of IV, allergy added.    Changes made to PTA medication list:  Added: all medications  Deleted: None  Changed: None    Medication Affordability:  Not including over the counter (OTC) medications, was there a time in the past 3 months when you did not take your medications as prescribed because of cost?: No    Allergies reviewed with patient and updates made in EHR: yes    Medication History Completed By: Art Galarza RPH 12/26/2023 8:13 AM    PTA Med List   Medication Sig Last Dose    acetaminophen (TYLENOL) 325 MG tablet Take 325-650 mg by mouth every 6 hours as needed for mild pain or fever Unknown at prn    Biotin w/ Vitamins C & E (HAIR SKIN & NAILS GUMMIES PO) Take 1 chew tab by mouth daily 12/25/2023    esomeprazole (NEXIUM) 40 MG DR capsule Take 40 mg by mouth daily as needed Unknown at prn

## 2023-12-26 NOTE — ED NOTES
Below text message sent to Dr. Page    Pt in ED room 13 MRN # [id number]  is requesting Nexium first before the Vanco can be restarted, According to pt, ABX gives her ulcers.     [facility]  [phone]

## 2023-12-26 NOTE — ED NOTES
Below text message sent to the Admitting Provider.    Pt in ED room # 13, MRN # [id number] c/o itching and attributed her symptoms to the Vancomycin. Vancomycin Stopped. VSS. asymptomatic.  Please advise    Thomas  [phone]

## 2023-12-26 NOTE — PROGRESS NOTES
RECEIVING UNIT ED HANDOFF REVIEW    ED Nurse Handoff Report was reviewed by: Silvia Jaramillo RN on December 26, 2023 at 1:05 PM

## 2023-12-26 NOTE — H&P
[facility]    History and Physical  Hospitalist       Date of Admission:  12/26/2023  Date of Service (when I saw the patient): 12/26/23    ASSESSMENT  Shweta Hilton is a markedly pleasant 28 year old woman who presents with pain, redness, and swelling of the right second finger and hand, and is found to have suspected acute right hand cellulitis.    PLAN     Acute right hand cellulitis: Of note, Ms. Hilton recently moved here from [facility]. She presents with acute pain, redness, and swelling of the right second finger and hand.    In the ED, she is afebrile, without hypotension, tachycardia or hypoxia. WBC is normal. She has a large blister at the lateral right second finger base, as well as fluctuant edema in the thenar space. Overall, her symptoms are consistent with right hand and 2nd finger cellulitis, with possible abscess, which could be staphylococcal given its purulence. Acute flexor tenosynovitis or septic arthritis are on the differential.      -- Inpatient. NPO. Orthopedics consulted. Empiric Vancomycin continued. Would order blood cultures if she spikes a fever. 125 ml/hour NS IVF ordered. Tylenol, Oxycodone, IV Dilaudid as needed for pain. Anti-emetics as needed. Repeat CBC and BMP in AM.    I have spent 60 minutes on the date of service doing chart review, history, examination, documentation, and further activities per the note.    Chief Complaint   Right hand pain    History is obtained from the patient , her  at the bedside, and the ED physician whom I have spoken with    History of Present Illness   Shweta Hilton is a markedly pleasant 28 year old woman who presents with acute onset of pain in the right hand that began 6 days ago. The pain began in an area above the first metacarpal bone, laterally, near where she had previously noted an abrasion with some skin exfoliation at the lateral aspect of her second finger. It has been constant since onset and become  "progressively more severe, spreading down the finger to the space between the second finger and thumb; she has noticed progressive redness and swelling as well. She has been using ship oil at home and frequent does of Tylenol without relief. A large blister has formed with drains white and sometimes green fluid. She has been unable to sleep at night due to the pain. She has felt hot and cold. She denies prior episodes of cellulitis in the past. She recently moved here from [facility] 25 days ago and prior to moving she says she was hospitalized several months ago for a left eye infection, and she says she bought 5 days worth of Augmentin and treated that infection thus, and it resolved. She denies any drining, drugs, or tobacco use. Tylenol and IV Dilaudid given in the ED have partially improved her symptoms. She otherwise denies any trauma or any other current or recent complaints.    While I am in the room, she receives her Dilaudid and then immediately collapses onto the bed, without loss of consciousness. She says she feels dizzy. Vital signs repeated and unchanged. A liter of NS has been hung.    In the ED,   12/26 0119 128/77 98  F (36.7  C) 69 16 99 %     CBC and BMP were within the normal reference range. WBC was 8.4. HCG is negative.    PHYSICAL EXAM  Blood pressure 128/77, pulse 69, temperature 98  F (36.7  C), temperature source Temporal, resp. rate 16, height 1.676 m (5' 6\"), weight 60 kg (132 lb 4.4 oz), SpO2 99%.  Constitutional: Alert and oriented to person, place and time; in pain  Respiratory: lungs clear to auscultation bilaterally  Cardiovascular: regular S1 S2  GI: abdomen soft non tender non distended bowel sounds positive  Musculoskeletal: right hand erythema, edema, tenderness and warmth centered around a large blister at the lateral base of the right second digit; there is fluctuance at the dorsal hand. See ED provider picture  Neurologic: extra-ocular muscles intact; moves all four " extremities  Psychiatric: anxious affect, appropriate insight and judgment     DVT Prophylaxis: Pneumatic Compression Devices  Code Status: Full Code    Disposition: Expected discharge in 2-3 days    Norman Page MD, MD    Past Medical History    I have reviewed this patient's medical history and updated it with pertinent information if needed.   Past Medical History:   Diagnosis Date    Eye infection     Peptic ulcer     Otherwise unspecified       Past Surgical History   I have reviewed this patient's surgical history and updated it with pertinent information if needed.  Past Surgical History:   Procedure Laterality Date     SECTION         Prior to Admission Medications   None     Allergies   Not on File    Social History   I have reviewed this patient's social history and updated it with pertinent information if needed. Shweta Hilton  reports that she has never smoked. She does not have any smokeless tobacco history on file. She reports that she does not drink alcohol and does not use drugs.    Family History   Family history assessed and, except as above, is non-contributory.    Review of Systems   The 10 point Review of Systems is negative other than noted in the HPI or here.     Primary Care Physician   Physician No Ref-Primary    Data   Labs Ordered and Resulted from Time of ED Arrival to Time of ED Departure - No data to display    Data reviewed today:  I personally reviewed no images or EKG's today.

## 2023-12-27 ENCOUNTER — APPOINTMENT (OUTPATIENT)
Dept: OCCUPATIONAL THERAPY | Facility: CLINIC | Age: 28
DRG: 982 | End: 2023-12-27
Attending: PHYSICIAN ASSISTANT

## 2023-12-27 LAB
ANION GAP SERPL CALCULATED.3IONS-SCNC: 9 MMOL/L (ref 7–15)
BASOPHILS # BLD AUTO: 0 10E3/UL (ref 0–0.2)
BASOPHILS NFR BLD AUTO: 0 %
BUN SERPL-MCNC: 11.4 MG/DL (ref 6–20)
CALCIUM SERPL-MCNC: 8.9 MG/DL (ref 8.6–10)
CHLORIDE SERPL-SCNC: 109 MMOL/L (ref 98–107)
CREAT SERPL-MCNC: 0.57 MG/DL (ref 0.51–0.95)
CRP SERPL-MCNC: 143.73 MG/L
DEPRECATED HCO3 PLAS-SCNC: 23 MMOL/L (ref 22–29)
EGFRCR SERPLBLD CKD-EPI 2021: >90 ML/MIN/1.73M2
EOSINOPHIL # BLD AUTO: 0 10E3/UL (ref 0–0.7)
EOSINOPHIL NFR BLD AUTO: 0 %
ERYTHROCYTE [DISTWIDTH] IN BLOOD BY AUTOMATED COUNT: 13.5 % (ref 10–15)
GLUCOSE BLDC GLUCOMTR-MCNC: 96 MG/DL (ref 70–99)
GLUCOSE SERPL-MCNC: 120 MG/DL (ref 70–99)
HCT VFR BLD AUTO: 36.9 % (ref 35–47)
HGB BLD-MCNC: 11.9 G/DL (ref 11.7–15.7)
IMM GRANULOCYTES # BLD: 0 10E3/UL
IMM GRANULOCYTES NFR BLD: 0 %
LYMPHOCYTES # BLD AUTO: 1.9 10E3/UL (ref 0.8–5.3)
LYMPHOCYTES NFR BLD AUTO: 15 %
MCH RBC QN AUTO: 29.2 PG (ref 26.5–33)
MCHC RBC AUTO-ENTMCNC: 32.2 G/DL (ref 31.5–36.5)
MCV RBC AUTO: 90 FL (ref 78–100)
MONOCYTES # BLD AUTO: 0.8 10E3/UL (ref 0–1.3)
MONOCYTES NFR BLD AUTO: 6 %
NEUTROPHILS # BLD AUTO: 10.5 10E3/UL (ref 1.6–8.3)
NEUTROPHILS NFR BLD AUTO: 79 %
NRBC # BLD AUTO: 0 10E3/UL
NRBC BLD AUTO-RTO: 0 /100
PLATELET # BLD AUTO: 258 10E3/UL (ref 150–450)
POTASSIUM SERPL-SCNC: 3.6 MMOL/L (ref 3.4–5.3)
RBC # BLD AUTO: 4.08 10E6/UL (ref 3.8–5.2)
SODIUM SERPL-SCNC: 141 MMOL/L (ref 135–145)
WBC # BLD AUTO: 13.2 10E3/UL (ref 4–11)

## 2023-12-27 PROCEDURE — 80048 BASIC METABOLIC PNL TOTAL CA: CPT | Performed by: HOSPITALIST

## 2023-12-27 PROCEDURE — 99232 SBSQ HOSP IP/OBS MODERATE 35: CPT | Performed by: PHYSICIAN ASSISTANT

## 2023-12-27 PROCEDURE — 97535 SELF CARE MNGMENT TRAINING: CPT | Mod: GO

## 2023-12-27 PROCEDURE — 999N000040 HC STATISTIC CONSULT NO CHARGE VASC ACCESS

## 2023-12-27 PROCEDURE — 250N000013 HC RX MED GY IP 250 OP 250 PS 637: Performed by: PHYSICIAN ASSISTANT

## 2023-12-27 PROCEDURE — 120N000001 HC R&B MED SURG/OB

## 2023-12-27 PROCEDURE — 85041 AUTOMATED RBC COUNT: CPT | Performed by: HOSPITALIST

## 2023-12-27 PROCEDURE — 250N000013 HC RX MED GY IP 250 OP 250 PS 637: Performed by: HOSPITALIST

## 2023-12-27 PROCEDURE — 99233 SBSQ HOSP IP/OBS HIGH 50: CPT | Performed by: HOSPITALIST

## 2023-12-27 PROCEDURE — 86140 C-REACTIVE PROTEIN: CPT | Performed by: PHYSICIAN ASSISTANT

## 2023-12-27 PROCEDURE — 250N000011 HC RX IP 250 OP 636: Performed by: PHYSICIAN ASSISTANT

## 2023-12-27 PROCEDURE — 258N000003 HC RX IP 258 OP 636: Performed by: PHYSICIAN ASSISTANT

## 2023-12-27 PROCEDURE — 250N000013 HC RX MED GY IP 250 OP 250 PS 637: Performed by: INTERNAL MEDICINE

## 2023-12-27 PROCEDURE — 36415 COLL VENOUS BLD VENIPUNCTURE: CPT | Performed by: HOSPITALIST

## 2023-12-27 PROCEDURE — 999N000127 HC STATISTIC PERIPHERAL IV START W US GUIDANCE

## 2023-12-27 PROCEDURE — 97165 OT EVAL LOW COMPLEX 30 MIN: CPT | Mod: GO

## 2023-12-27 RX ORDER — BENZONATATE 100 MG/1
100 CAPSULE ORAL 3 TIMES DAILY PRN
Status: DISCONTINUED | OUTPATIENT
Start: 2023-12-27 | End: 2023-12-31 | Stop reason: HOSPADM

## 2023-12-27 RX ORDER — OXYCODONE HYDROCHLORIDE 5 MG/1
5 TABLET ORAL EVERY 4 HOURS PRN
Status: DISCONTINUED | OUTPATIENT
Start: 2023-12-27 | End: 2023-12-31 | Stop reason: HOSPADM

## 2023-12-27 RX ORDER — ESOMEPRAZOLE MAGNESIUM 40 MG/1
40 CAPSULE, DELAYED RELEASE ORAL
Status: DISCONTINUED | OUTPATIENT
Start: 2023-12-28 | End: 2023-12-27

## 2023-12-27 RX ORDER — PANTOPRAZOLE SODIUM 40 MG/1
40 TABLET, DELAYED RELEASE ORAL
Status: DISCONTINUED | OUTPATIENT
Start: 2023-12-27 | End: 2023-12-31 | Stop reason: HOSPADM

## 2023-12-27 RX ADMIN — DOCUSATE SODIUM 50 MG AND SENNOSIDES 8.6 MG 1 TABLET: 8.6; 5 TABLET, FILM COATED ORAL at 08:49

## 2023-12-27 RX ADMIN — PANTOPRAZOLE SODIUM 40 MG: 40 TABLET, DELAYED RELEASE ORAL at 15:56

## 2023-12-27 RX ADMIN — DOCUSATE SODIUM 50 MG AND SENNOSIDES 8.6 MG 1 TABLET: 8.6; 5 TABLET, FILM COATED ORAL at 20:51

## 2023-12-27 RX ADMIN — ACETAMINOPHEN 650 MG: 325 TABLET, FILM COATED ORAL at 15:52

## 2023-12-27 RX ADMIN — Medication 1 LOZENGE: at 01:14

## 2023-12-27 RX ADMIN — BENZONATATE 100 MG: 100 CAPSULE ORAL at 08:49

## 2023-12-27 RX ADMIN — VANCOMYCIN HYDROCHLORIDE 1250 MG: 10 INJECTION, POWDER, LYOPHILIZED, FOR SOLUTION INTRAVENOUS at 05:44

## 2023-12-27 RX ADMIN — VANCOMYCIN HYDROCHLORIDE 1250 MG: 10 INJECTION, POWDER, LYOPHILIZED, FOR SOLUTION INTRAVENOUS at 17:29

## 2023-12-27 RX ADMIN — AMPICILLIN SODIUM AND SULBACTAM SODIUM 3 G: 2; 1 INJECTION, POWDER, FOR SOLUTION INTRAMUSCULAR; INTRAVENOUS at 08:48

## 2023-12-27 RX ADMIN — ACETAMINOPHEN 650 MG: 325 TABLET, FILM COATED ORAL at 08:49

## 2023-12-27 RX ADMIN — AMPICILLIN SODIUM AND SULBACTAM SODIUM 3 G: 2; 1 INJECTION, POWDER, FOR SOLUTION INTRAMUSCULAR; INTRAVENOUS at 19:00

## 2023-12-27 RX ADMIN — Medication 1 LOZENGE: at 06:05

## 2023-12-27 RX ADMIN — AMPICILLIN SODIUM AND SULBACTAM SODIUM 3 G: 2; 1 INJECTION, POWDER, FOR SOLUTION INTRAMUSCULAR; INTRAVENOUS at 13:00

## 2023-12-27 RX ADMIN — ASPIRIN 81 MG: 81 TABLET, COATED ORAL at 08:50

## 2023-12-27 RX ADMIN — AMPICILLIN SODIUM AND SULBACTAM SODIUM 3 G: 2; 1 INJECTION, POWDER, FOR SOLUTION INTRAMUSCULAR; INTRAVENOUS at 01:13

## 2023-12-27 RX ADMIN — BENZONATATE 100 MG: 100 CAPSULE ORAL at 15:53

## 2023-12-27 RX ADMIN — OXYCODONE HYDROCHLORIDE 5 MG: 5 TABLET ORAL at 22:27

## 2023-12-27 RX ADMIN — OXYCODONE HYDROCHLORIDE 10 MG: 5 TABLET ORAL at 08:49

## 2023-12-27 RX ADMIN — BENZONATATE 100 MG: 100 CAPSULE ORAL at 04:10

## 2023-12-27 RX ADMIN — TRAMADOL HYDROCHLORIDE 25 MG: 50 TABLET, FILM COATED ORAL at 17:41

## 2023-12-27 RX ADMIN — ASPIRIN 81 MG: 81 TABLET, COATED ORAL at 20:51

## 2023-12-27 ASSESSMENT — ACTIVITIES OF DAILY LIVING (ADL)
ADLS_ACUITY_SCORE: 18
PREVIOUS_RESPONSIBILITIES: MEAL PREP;HOUSEKEEPING;LAUNDRY;SHOPPING;MEDICATION MANAGEMENT;FINANCES
DEPENDENT_IADLS:: INDEPENDENT
ADLS_ACUITY_SCORE: 18

## 2023-12-27 NOTE — PROGRESS NOTES
[facility]    Infectious Disease Progress Note    Date of Service (when I saw the patient): 12/27/2023     Assessment & Plan   Shweta Hilton is a 28 year old who was admitted on 12/26/2023.     Impression:  29 yo female coming in with pain redness and swelling on the right hand that started 7 days ago and progressively worsening   No known trauma   S/p I&D of right index finger, right index finger flexor sheath, and right carpal tunnel 12/26/23. OR cultures with staph aureus thus far.   On vancomycin and Unasyn.     Recommendations:   Continue on vancomycin pending operative culture sensitivities. Also on Unasyn pending final culture results.  Recommendations for antibiotics on discharge pending culture results.     Patient and plan discussed with Dr. Perez.     Mary Beth Luciano PA-C    Interval History   Tolerating antibiotics ok   No new rashes or issues with antibiotics   Wants Omeprazole for known PUD. Some pain in hand today.   Labs reviewed   No changes to past medical, social or family history         Physical Exam   Temp: 98.4  F (36.9  C) Temp src: Oral BP: (!) 83/47 Pulse: 81   Resp: 16 SpO2: 98 % O2 Device: None (Room air) Oxygen Delivery: 3 LPM  Vitals:    12/26/23 0119 12/26/23 1341   Weight: 60 kg (132 lb 4.4 oz) 58.8 kg (129 lb 10.1 oz)     Vital Signs with Ranges  Temp:  [97.7  F (36.5  C)-98.4  F (36.9  C)] 98.4  F (36.9  C)  Pulse:  [] 81  Resp:  [11-16] 16  BP: ()/(47-88) 83/47  SpO2:  [94 %-100 %] 98 %    Constitutional: Awake, alert, cooperative, no apparent distress  Lungs: Clear to auscultation bilaterally, no crackles or wheezing  Cardiovascular: Regular rate and rhythm, normal S1 and S2, and no murmur noted  Abdomen: Normal bowel sounds, soft, non-distended, non-tender  Skin: No rashes, no cyanosis. Right hand swollen in dressing. Did not remove dressing.   Other:    Medications    lactated ringers Stopped (12/27/23 0424)    sodium chloride Stopped  (12/27/23 0400)      ampicillin-sulbactam  3 g Intravenous Q6H    aspirin  81 mg Oral BID    polyethylene glycol  17 g Oral Daily    senna-docusate  1 tablet Oral BID    sodium chloride (PF)  3 mL Intracatheter Q8H    vancomycin  1,250 mg Intravenous Q12H       Data   All microbiology laboratory data reviewed.  Recent Labs   Lab Test 12/27/23  1251 12/26/23  0721 12/26/23  0445   WBC 13.2* 9.7 8.4   HGB 11.9 13.2 14.2   HCT 36.9 39.4 42.2   MCV 90 90 89    236 281     Recent Labs   Lab Test 12/27/23  1251 12/26/23  0721 12/26/23  0445   CR 0.57 0.56 0.53     Recent Labs   Lab Test 12/26/23  0721   SED 6     CRP Inflammation   Date Value Ref Range Status   12/27/2023 143.73 (H) <5.00 mg/L Final      Collected Updated Procedure Result Status    12/26/2023 2351 12/27/2023 1432 Blood Culture Arm, Left [[id number]]    Blood from Arm, Left    Preliminary result Component Value   Culture No growth after 12 hours P             12/26/2023 2322 12/27/2023 1432 Blood Culture Arm, Left [[id number]]    Blood from Arm, Left    Preliminary result Component Value   Culture No growth after 12 hours P             12/26/2023 1947 12/26/2023 2002 Anaerobic Bacterial Culture Routine [[id number]]   Abscess from Finger, Right    In process Component Value   No component results             12/26/2023 1947 12/27/2023 1409 Abscess Aerobic Bacterial Culture Routine [[id number]]   (Abnormal)   Abscess from Finger, Right    Preliminary result Component Value   Culture Culture in progress P    3+ Staphylococcus aureus Abnormal  P             12/26/2023 1947 12/26/2023 2207 Fungal or Yeast Culture Routine [[id number]]   Abscess from Finger, Right    In process Component Value   No component results

## 2023-12-27 NOTE — PROGRESS NOTES
SW contacted FC to see if patient might qualify for SUBHASH    Zachariah Fulton, ELAW    [facility]

## 2023-12-27 NOTE — OP NOTE
Surgeon: Dr. Martina Edwards MD.  First assistant:     Preoperative diagnosis: Right index infection on dorsal and volar side                                         Right infected Carpal tunnel    Post-operative diagnosis: Likely Spider bite with focal area of necrosis and secondary infection    Procedure: Irrigation and debridement of right index finger dorsal and volar side.    #2 irrigation and debridement of right index finger flexor sheath.    #3 irrigation and debridement of right carpal tunnel    Anesthesia: General    Tourniquet time: 1 hour    Informed consent: Informed consent was obtained in the holding area.    Surgical site signed: The surgical site was signed by me prior to entering the OR.    Time out: A time-out was performed confirming the surgical site prior to incision.    Prophylactic antibiotics: Vanco.  Unasyn was held until after cultures were taken.    DVT indications: None    Procedure and findings:   After a thorough discussion in the holding area, the patient denied any laceration or bites to her right index finger.  She states that in [place] there are many people who often develop spontaneous pus which will drain and there is no sequela.  She did report an eye infection and was told she had a blood infection and was placed on Augmentin while in [place].  Her hand today demonstrated a near compartment syndrome of the carpal tunnel, hemal pus at the radial MP crease of the finger, and dorsal hand erythema and swelling extending from the MP joint to the base of the index metacarpal.  The risk and benefits of the surgery were described to the patient and her partner.    She was brought to the operating room given a general anesthetic and then her hand was prepped and draped with a Betadine scrub and a Betadine paint.  Her arm was elevated, not exsanguinated and the tourniquet was inflated 150 mm above her systolic pressure.  A timeout was performed.    At the right index [place] type incisions  were made from the PIP crease of the index at the ulnar corner carried to the radial corner at the MP joint carried back ulnar towards the MP crease of the palm and then extended down along the proximal aspect of the thenar crease.  Hemal pus burst from the wound at the base of the right index finger and cultures were taken.  The skin was opened up all the way to the level of the DIP joint and retracted with a 4-0 nylon.  There was cloudy fluid and necrotic fat in the index finger on the volar surface and this was debrided with the tenotomy scissors a rongeur and my pickups.  The wound because it was on the radial side of the right index extended dorsally and the patient was found to have hemal pus and necrotic fat extending over the extensor mechanism of the right index finger.  I used a curette to scrape the skin on the dorsum of the index finger to remove pus and necrotic fat and irrigated that area out copiously with antibiotic solution.  A propofol feeding tube had 1 end  cut and this was placed beneath the flexor sheath at the A1 pulley level and fed down beneath the PIP crease.While irrigating the flexor sheath the fluid ran freely down the flexor sheath but came out on the radial side where the infection had eaten through the pulley system and the fluid also came out distally at the DIP joint.    A carpal tunnel incision was made parallel to the thenar crease carried across the wrist in an ulnar direction and then back proximally.  A [place] was placed beneath the antebrachial fascia and Gerri retractors were used to retract the subcutaneous fat over the transverse carpal ligament.  Of note there was significant fluid above the transverse carpal ligament that Is draining while I was trying to cut the transverse carpal ligament with a 15 blade this was cloudy serosanguineous fluid.  The carpal canal was swollen with cloudy fluid but did not have hemal pus.  At the distal end of the wound I used my scissors to  bluntly dissect the first webspace and again cloudy fluid came out in the thenar bursa so this area was formally irrigated out while holding retractors.  The carpal canal was irrigated out copiously as well as the thenar bursa the dorsal subcutaneous skin pocket and the flexor sheath were all irrigated out with antibiotic solution.  The necrotic skin was removed from the area at the MP joint I also did removed infected necrotic fat from that radial corner.  I placed 2 red vessel loop drains 1 for the dorsal subcutaneous skin infection and another to drain the carpal tunnel and then left the green propofol tubing to continue to drain the index flexor sheath.    Marcaine with epi was injected in the carpal tunnel incision and plain Marcaine was injected for the index finger and irrigated through the tubing.  The tourniquet was let down and there were no's significant bleeders to be cauterized.  The skin of the index finger was closed with 4-0 chromic.  The skin at the carpal canal was closed with some inverted 3-0 undyed Vicryl subcutaneous stitches and 4-0 nylon on the skin.      The drains were all placed and an ABD pad in the most proximal area of the forearm bacitracin and Adaptic were applied over the open wound over the wounds in the hand gauze and a Kerlix roll were used as well as cast padding.  The index finger had an AlumaFoam splint attached with Coban to the dorsum of the finger to prevent early range of motion and dislodging the drains.  All the drains can be removed on Thursday morning and early range of motion and should begin then.

## 2023-12-27 NOTE — CONSULTS
"CLINICAL NUTRITION SERVICES  -  ASSESSMENT NOTE    Malnutrition:   % Weight Loss:  Up to 1-2% in 1 week (moderate malnutrition)  % Intake:  </= 50% for >/= 5 days (severe malnutrition)  Subcutaneous Fat Loss:  None observed  Muscle Loss:  None observed  Fluid Retention:  None noted    Malnutrition Diagnosis: Moderate malnutrition  In Context of:  Acute illness or injury     REASON FOR ASSESSMENT  Shweta Hilton is a 28 year old female seen by Registered Dietitian for Nutrition Admission Risk Screen Received -   Have you recently lost weight without trying ? - \"yes, unsure\"  Have you been eating poorly due to a decreased appetite ?- \"no\"    NUTRITION HISTORY  - Information obtained from chart and patient report.   - Pt admitted with hand infection due to possible spider bite.   - Pt reports poor appetite due to pain for about 1 week PTA. She was eating significantly less than baseline.   - She isn't sure how much weight she may have lost, but reports a UBW around 60kg.   - NKFA      CURRENT NUTRITION ORDERS  Diet Order:     Regular     Current Intake/Tolerance:  Appetite is coming back. She ate about 50% of a large breakfast. Writer assisted pt in ordering a large lunch during visit.     NUTRITION FOCUSED PHYSICAL ASSESSMENT FOR DIAGNOSING MALNUTRITION)  Yes         Observed:    No nutrition-related physical findings observed    Obtained from Chart/Interdisciplinary Team:  12/26 -   Irrigation and debridement of right index finger dorsal and volar side.   #2 irrigation and debridement of right index finger flexor sheath.    #3 irrigation and debridement of right carpal tunnel     ANTHROPOMETRICS  Height: 5' 6.142\"  Weight: 129 lbs 10.09 oz (58.8 kg)   Body mass index is 20.83 kg/m .  Weight Status:  Normal BMI  IBW: 59.4 kg   % IBW: 99%  Weight History: up to 2% in 1 week per patient report (60 kg --> 58.8 kg)  Wt Readings from Last 10 Encounters:   12/26/23 58.8 kg (129 lb 10.1 oz)     LABS  Labs " reviewed    MEDICATIONS  Medications reviewed  Senokot BID - given this morning  Miralax - held  IVF stopped     ASSESSED NUTRITION NEEDS PER APPROVED PRACTICE GUIDELINES:  Dosing Weight 59 kg   Estimated Energy Needs: 0290-9946 kcals (25-30 Kcal/Kg)  Justification: maintenance  Estimated Protein Needs: 71-89 grams protein (1.2-1.5 g pro/Kg)  Justification: preservation of lean body mass  Estimated Fluid Needs: 1 mL/kcal   Justification: maintenance    MALNUTRITION:  % Weight Loss:  Up to 1-2% in 1 week (moderate malnutrition)  % Intake:  </= 50% for >/= 5 days (severe malnutrition)  Subcutaneous Fat Loss:  None observed  Muscle Loss:  None observed  Fluid Retention:  None noted    Malnutrition Diagnosis: Moderate malnutrition  In Context of:  Acute illness or injury    NUTRITION DIAGNOSIS:  Inadequate oral intake related to poor appetite in setting of acute hand infection as evidenced by poor PO x1 week, with up to 2% weight loss.     NUTRITION INTERVENTIONS  Recommendations / Nutrition Prescription  Regular diet. Assisted pt in ordering an adequate lunch.     Implementation  Nutrition education: Per Provider order if indicated     Nutrition Goals  Intake of at least 75% adequate trays BID-TID.     MONITORING AND EVALUATION:  Progress towards goals will be monitored and evaluated per protocol and Practice Guidelines    Judy Ruiz RD, LD  Pager: [phone]  Weekend Pager: [phone]

## 2023-12-27 NOTE — ANESTHESIA PROCEDURE NOTES
Airway       Patient location during procedure: OR       Procedure Start/Stop Times: 12/26/2023 7:22 PM  Staff -        Anesthesiologist:  Selvin Ford MD       CRNA: Shelia Balderas APRN CRNA       Performed By: CRNA  Consent for Airway        Urgency: elective  Indications and Patient Condition       Indications for airway management: yamila-procedural       Induction type:RSI (cricoid pressure)       Mask difficulty assessment: 0 - not attempted    Final Airway Details       Final airway type: endotracheal airway       Successful airway: ETT - single  Endotracheal Airway Details        ETT size (mm): 7.0       Cuffed: yes       Successful intubation technique: direct laryngoscopy       DL Blade Type: MAC 3       Grade View of Cords: 1       Adjucts: stylet       Position: Left       Measured from: lips       Secured at (cm): 21       Bite block used: None    Post intubation assessment        Placement verified by: capnometry, equal breath sounds and chest rise        Number of attempts at approach: 1       Secured with: tape       Ease of procedure: easy       Dentition: Intact and Unchanged    Medication(s) Administered   Medication Administration Time: 12/26/2023 7:22 PM

## 2023-12-27 NOTE — PROGRESS NOTES
12/27/23 1106   Appointment Info   Signing Clinician's Name / Credentials (OT) Jose David Landeros OTR/L   Rehab Comments (OT) Per MD orders: Patient to work on independent finger ROM. But NO index finger ROM until drains and splint are removed on the afternoon of POD 2. Currently surgical site is splinted.   Living Environment   People in Home spouse;child(liza), dependent  (2 year old Son)   Current Living Arrangements apartment   Home Accessibility no concerns   Transportation Anticipated family or friend will provide  (Does not drive)   Living Environment Comments Pt reported that she just arrived to the [facility]. RUE dominant.   Self-Care   Usual Activity Tolerance good   Current Activity Tolerance moderate   Equipment Currently Used at Home none   Activity/Exercise/Self-Care Comment Independent in all ADLs and mobility at baseline.   Instrumental Activities of Daily Living (IADL)   Previous Responsibilities meal prep;housekeeping;laundry;shopping;medication management;finances   General Information   Onset of Illness/Injury or Date of Surgery 12/26/23   Referring Physician Milly Gutierrez PA-C   Patient/Family Therapy Goal Statement (OT) Home   Additional Occupational Profile Info/Pertinent History of Current Problem Right Index Finger Irrigation and debridement OF FLEXOR SHEATH AND OPEN CARPAL TUNNEL, AND THENAR WEB SPACE POD #1. See chart for details. Per MD orders: Patient to work on independent finger ROM. But NO index finger ROM until drains and splint are removed on the afternoon of POD 2. Currently surgical site is splinted.   Cognitive Status Examination   Orientation Status orientation to person, place and time   Pain Assessment   Patient Currently in Pain Yes, see Vital Sign flowsheet   Strength Comprehensive (MMT)   Comment, General Manual Muscle Testing (MMT) Assessment Decreased RUE finger strength secondary to surgery, per clinical judgement   Transfers   Transfers bed-chair transfer;sit-stand  transfer;toilet transfer   Transfer Skill: Bed to Chair/Chair to Bed   Bed-Chair [place] (Transfers) set up;verbal cues;modified independence   Sit-Stand Transfer   Sit-Stand [place] (Transfers) independent   Toilet Transfer   Type (Toilet Transfer) stand-sit;sit-stand   [place] Level (Toilet Transfer) set up;verbal cues;modified independence   Activities of Daily Living   BADL Assessment/Intervention upper body dressing;lower body dressing   Lower Body Dressing Assessment/Training   [place] Level (Lower Body Dressing) doff;don;pants/bottoms;modified independence   Clinical Impression   Criteria for Skilled Therapeutic Interventions Met (OT) Yes, treatment indicated   OT Diagnosis Decreased independence in I/ADls.   Influenced by the following impairments Decreased independence in I/ADls.   OT Problem List-Impairments impacting ADL problems related to;activity tolerance impaired;range of motion (ROM);pain;strength;post-surgical precautions   Assessment of Occupational Performance 1-3 Performance Deficits   Identified Performance Deficits Decreased independence in I/ADls. (Dressing, bathing, toileting, childcare)   Planned Therapy Interventions (OT) ADL retraining;IADL retraining;ROM;transfer training;home program guidelines   Clinical Decision Making Complexity (OT) problem focused assessment/low complexity   Risk & Benefits of therapy have been explained care plan/treatment goals reviewed;risks/benefits reviewed;patient;participants included   OT Total Evaluation Time   OT Eval, Low Complexity Minutes (92226) 7   OT Goals   Therapy Frequency (OT) One time eval and treatment   OT Predicted Duration/Target Date for Goal Attainment 12/27/23   OT Goals Upper Body Dressing;Hygiene/Grooming;Lower Body Dressing;Toilet Transfer/Toileting;OT Goal 1;OT Goal 2   OT: Hygiene/Grooming supervision/stand-by assist;while standing   OT: Upper Body Dressing Supervision/stand-by assist;within precautions   OT:  Lower Body Dressing Supervision/stand-by assist;within precautions   OT: Toilet Transfer/Toileting Supervision/stand-by assist;toilet transfer;cleaning and garment management   OT: Goal 1 Pt will verbalize understanding of 100% of ROM  guidelines (per MD orders) in order to increase IND in I/ADls.   OT: Goal 2 Pt will verbalize understanding of 3 compensatory techniques for I/ADLs, in order to increase IND in I/ADLs.   Interventions   Interventions Quick Adds Self-Care/Home Management   Self-Care/Home Management   Self-Care/Home Mgmt/ADL, Compensatory, Meal Prep Minutes (04196) 23   Symptoms Noted During/After Treatment (Meal Preparation/Planning Training) dizziness   Treatment Detail/Skilled Intervention Educated on UE and LE dressing with compensatory techniques and within precautions. Pt completed LE dressing (don underwear and don/doff socks) with Mod I after education, pt requiring extra time as well. Educated on UE dressing with one handed techniques and compensatory techniques, pt reporting confidence and declined to trial. Pt completed transfer to the toilet with Mod I after education. Educated on precautions per MD and ROM per MD once splint is removed, pt verbalized understanding and confidence. Educated on compensatory techniques for I/ADLs within precautions, pt verbalized understanding. Pt reported dizziness during session, however vitals stable, RN aware of dizziness. No further IP OT warranted.   OT Discharge Planning   OT Plan d/c   OT Discharge Recommendation (DC Rec) home with assist;home with outpatient occupational therapy   OT Rationale for DC Rec Home with assist in strenuous I/ADls (home management tasks, laundry, childcare etc). OP hand therapy to address strengthening and ROM to increase IND in I/ADLs.   OT Brief overview of current status Mod I LE dressing and toilet transfer   Total Session Time   Timed Code Treatment Minutes 23   Total Session Time (sum of timed and untimed services) 30

## 2023-12-27 NOTE — PROGRESS NOTES
Orthopedic Surgery  Shweta Hilton  12/27/2023     Admit Date:  12/26/2023    POD: 1 Day Post-Op   Procedure(s):  Right Index Finger Irrigation and debridement OF FLEXOR SHEATH AND OPEN CARPAL TUNNEL, AND THENAR WEB SPACE     Patient resting comfortably in bed.    Pain remains in hand.  Tolerating oral intake.    Reports dizziness with Oxycodone     Temp:  [97.7  F (36.5  C)-98.4  F (36.9  C)] 98.2  F (36.8  C)  Pulse:  [] 89  Resp:  [11-16] 16  BP: ()/(47-88) 113/79  SpO2:  [94 %-100 %] 95 %    Alert and oriented  Dressing is clean, dry, and intact.   Sensation intact  Brisk cap refill  Able to gently range fingers 3-5 and thumb    Labs:  Recent Labs   Lab Test 12/27/23  1251 12/26/23  0721 12/26/23  0445   WBC 13.2* 9.7 8.4   HGB 11.9 13.2 14.2    236 281     No lab results found.  Recent Labs   Lab Test 12/27/23  1251 12/26/23  0721   CRPI 143.73* 60.30*     1. PLAN:   Abx per ID   Elevate above chest height when at rest   Keep dressings intact, Ortho PA to change dressings and pull drains tomorrow.     Decreased dose of Oxycodone and will add Tramadol if dizziness continues with the Oxycodone.     2. Disposition   Anticipate d/c to home when medically stable and Abx plan established     Rachel Godinez PA-C

## 2023-12-27 NOTE — ANESTHESIA POSTPROCEDURE EVALUATION
Patient: Shweta Hilton    Procedure: Procedure(s):  Right Index Finger Irrigation and debridement OF FLEXOR SHEATH AND OPEN CARPAL TUNNEL, AND THENAR WEB SPACE       Anesthesia Type:  General    Note:  Disposition: Inpatient   Postop Pain Control: Uneventful            Sign Out: Well controlled pain   PONV: No   Neuro/Psych: Uneventful            Sign Out: Acceptable/Baseline neuro status   Airway/Respiratory: Uneventful            Sign Out: Acceptable/Baseline resp. status   CV/Hemodynamics: Uneventful            Sign Out: Acceptable CV status   Other NRE: NONE   DID A NON-ROUTINE EVENT OCCUR? No           Last vitals:  Vitals Value Taken Time   BP 98/62 12/26/23 2200   Temp 36.5  C (97.7  F) 12/26/23 2110   Pulse 65 12/26/23 2209   Resp 12 12/26/23 2209   SpO2 69 % 12/26/23 2209   Vitals shown include unfiled device data.    Electronically Signed By: Selvin Ford MD  December 26, 2023  10:11 PM

## 2023-12-27 NOTE — PLAN OF CARE
DATE & TIME: 12/26/2023 1930-0730    Cognitive Concerns/ Orientation : AOx4, pleasant    BEHAVIOR & AGGRESSION TOOL COLOR: Green  CIWA SCORE: na   ABNL VS/O2: VSS, RA on [place]   MOBILITY: SBA  PAIN MANAGMENT: Denies  DIET: Reg, tolerated well with good appetite after surgery  BOWEL/BLADDER: Continent of both  ABNL LAB/BG: Chloride 108, CO2 21, , 96. Blood cultures pending  DRAIN/DEVICES: L PIV   TELEMETRY RHYTHM: na  SKIN: WDL ex few scattered bruises and old scars on abdomen  TESTS/PROCEDURES: Right index finger irrigation and debridement completed.   D/C DAY/GOALS/PLACE: Pending  OTHER IMPORTANT INFO:  Pt recently immigrated to the  from [facility] less than a month, pt prefers no male caregivers.  at bedside. R index finger irrigation and debridement completed. The index finger has a AlumaFolam splint attached with coban to the dorsum of the finger to prevent early range of motion and dislodging the drains. The drains can be removed on Thursday morning. Pt came with loose cough, PRN lozenge x2 given-didn't help relief, PRN x1 Tessalon given.   Vancomycin running 250ml/hr, IV Unasyn q6hr.                           5 yo M s/p prolonged cardiac arrest during elective dental procedure w/ resultant HIE and acute respiratory failure, s/p transaminitis, s/p treatment of Klebsiella tracheitis/pneumonia, s/p palliative extubation on 5/26 and pt maintaining airway. S/p DNR/DNI, now FULL CODE status, with discharge planning in process for acute inpatient rehab. spot possibly opening 7/18, s/p PICU downgrade on 6/4, s/p GJ tube placement on 6/22. Approved for transfer to [facility]. 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    Patient is experiencing further episodes today of fever, diaphoresis, and tachypnea likely due to his autonomic dysnomia. Motrin and Tylenol were given as needed. Chest physio and 1 dose of albuterol was held for today due acute pyrexia and diaphoresis. UA resulted within normal limits and likely indicate that pt is well hydrated. As per heme/onc consult, HLH is unlikely given normal coag/fibrinogen/ferritin levels. Low suspicion for hypertension secondary to pheochromocytoma given that urine metanephrines showed no abnormalities. Given patient has dental decay, dental following and will plan for possible extraction for tomorrow under local anesthesia in the OR performed by Dr. Driscoll and Dr. Serna. Per dental and ID pt does not require prophylactic abx before procedure.       Plan:  Resp:  - RA  - Albuterol & Chest PT q8h  - Chest Vest QD (15:00)  - Suctioning before feeds and PRN    CVS:  - Clonidine 0.1 mg Q8H via G-tube   - Labetalol 20mg at 05:00, 11:00, 17:00 via G-tube  - Amlodipine 2mg Q12H PRN if systolic BP>130 or diastolic BP >80 **CALL DR. RAIN IF BP>130**  - **If any BP meds are held, re-assess after 2 hours  - Hold medications if SBP <90  - ECHO done, normal per Dr. Treviño, pending official read    FEN/GI:  - Continuous feeds of Pediasure 1.0 w/ fiber @55 cc/hr   - 85 cc free water flush q6hr  - Head elevation 30-50 degrees  - 10 cc free water flush post meds  - Senna daily; Dulcolax suppository prn if no stool q48h  - Daily culturelle  - Routine I/Os  - Weekly weights M/Th  - Fungal culture of tongue: no growth prelim  -s/p NS bolus 10cc/kg x1 (7/23)  - D5NS @ 1/2maintence (28cc/hr)-dcd 7/27    ID:  - s/p Zosyn IV (7/19- 7/25)  - s/p Fluconazole 220 mg (12 mg/kg) q24h IV (7/19 - 7/25)  - Tylenol, Motrin PRN via G-tube for fever (>100.4 F)    Neuro:   - Baclofen 7.5mg q8h (s/p 5mg 07/24/22-07/27/22)  - s/p Gabapentin 300mg Q24H via G-tube- per wean off plan, d/c on 7/24  - s/p Baclofen 5mg Q12H  - Urine catecholamines pending, urine metanephrines wnl    Care:  - NS flush to mouth TID  - Lacrilube bilateral eyes q12h  - Aquaphor topical dry skin PRN  - Zinc oxide to buttock area PRN  - PT/OT consulted - daily  - ST - once every week    Social Work  - Following  - Continue with f/u with acute care facilities and  - [facility]  - s/p IV R. forearm  - 16Fr 30cm GJ tube in place (06/22)  - Meds via G-tube, feeds via J-tube  - Only anesthesia for IV placements

## 2023-12-27 NOTE — PLAN OF CARE
Goal Outcome Evaluation:  Summary: possible R hand infection  DATE & TIME: 12/26/2023 [id number]  COGNITIVE CONCERNS/ORIENTATION: AxOx4, pleasant   BEHAVIOR & AGGRESSOIN TOOL COLOR: green  ABNL VS/ O2: VSS on RA  MOBILITY: Independent  PAIN MANAGMENT: C/O severe 10/10 pain  in R hand. PRN IV dilauded given x2, PO oxy x1 and tylenol PO x1. Patient states mostly ineffective.   DIET: NPO   BOWEL/BLADDER:  Continent of bowel and bladder   ABNL LAB/BG: CRP 60.30  DRAIN/DEVICES: 2x PIVs. 1 in L hand and the other in R arm.   SKIN: WDL except a few scattered bruises and old scars on abdomen.   TESTS/PROCEDURES: Right Index Finger Irrigation And Debridement - left at 1745  D/C DATE:Pending.   OTHER INFO: Patient is recently immigrated to the  from [facility] less than a month, Patient prefers no males to care for her.

## 2023-12-27 NOTE — ANESTHESIA CARE TRANSFER NOTE
Patient: Shweta Hilton    Procedure: Procedure(s):  Right Index Finger Irrigation and debridement OF FLEXOR SHEATH AND OPEN CARPAL TUNNEL, AND THENAR WEB SPACE       Diagnosis: Cellulitis, unspecified cellulitis site [L03.90]  Infection of finger [L08.9]  Diagnosis Additional Information: No value filed.    Anesthesia Type:   General     Note:    Oropharynx: oropharynx clear of all foreign objects and spontaneously breathing  Level of Consciousness: drowsy  Oxygen Supplementation: nasal cannula  Level of Supplemental Oxygen (L/min / FiO2): 3  Independent Airway: airway patency satisfactory and stable  Dentition: dentition unchanged  Vital Signs Stable: post-procedure vital signs reviewed and stable  Report to RN Given: handoff report given  Patient transferred to: PACU    Handoff Report: Identifed the Patient, Identified the Reponsible Provider, Reviewed the pertinent medical history, Discussed the surgical course, Reviewed Intra-OP anesthesia mangement and issues during anesthesia, Set expectations for post-procedure period and Allowed opportunity for questions and acknowledgement of understanding      Vitals:  Vitals Value Taken Time   /88 12/26/23 2112   Temp     Pulse 80 12/26/23 2113   Resp 41 12/26/23 2113   SpO2 100 % 12/26/23 2113   Vitals shown include unfiled device data.    Electronically Signed By: CONNOR Morel CRNA  December 26, 2023  9:14 PM

## 2023-12-27 NOTE — PROGRESS NOTES
[facility]    Medicine Progress Note - Hospitalist Service    Date of Admission:  12/26/2023    Assessment & Plan    Shweta Hilton is a markedly pleasant 28 year old woman who presents with pain, redness, and swelling of the right second finger and hand, and is found to have suspected acute right hand cellulitis.     PLAN      Acute right hand cellulitis:     Of note, Ms. Hilton recently moved here from [facility]. She presents with acute pain, redness, and swelling of the right second finger and hand.   In the ED, she is afebrile, without hypotension, tachycardia or hypoxia. WBC is normal. She has a large blister at the lateral right second finger base, as well as fluctuant edema in the thenar space. Overall, her symptoms are consistent with right hand and 2nd finger cellulitis, with possible abscess, which could be staphylococcal given its purulence. Acute flexor tenosynovitis or septic arthritis are on the differential.     Plan  -- Orthopedics consulted.  Input appreciated.  Status post right index finger I&D of flexor/sheath and open carpal tunnel, and thenar webspace.  --Per hospital, this is likely a spider bite with focal area of necrosis and secondary infection.  -- Infectious disease consulted.  Input appreciated.  Continue vancomycin and Unasyn.  --Continue to follow perioperative cultures.  --Pain control and IV fluids..          Diet: Regular Diet Adult    DVT Prophylaxis: Pneumatic Compression Devices  Slaughter Catheter: Not present  Lines: None     Cardiac Monitoring: None  Code Status: Full Code      Clinically Significant Risk Factors                          # Moderate Malnutrition: based on nutrition assessment, PRESENT ON ADMISSION   # Financial/Environmental Concerns: none         Disposition Plan      Expected Discharge Date: 12/29/2023      Destination: home with family              Veronica Humphreys MD  Hospitalist Service  [facility]  Securely message  with Genny (more info)  Text page via [facility] Paging/Directory   ______________________________________________________________________    Interval History   Patient admits to some pain but states improved compared to previous.    Physical Exam   Vital Signs: Temp: 98.4  F (36.9  C) Temp src: Oral BP: (!) 83/47 Pulse: 81   Resp: 16 SpO2: 98 % O2 Device: None (Room air) Oxygen Delivery: 3 LPM  Weight: 129 lbs 10.09 oz    General Appearance: Well appearing for stated age.  Respiratory: CTAB, no rales or ronchi  Cardiovascular: S1, S2 normal, no murmurs  GI: non-tender on palpation, BS present      Medical Decision Making       55 MINUTES SPENT BY ME on the date of service doing chart review, history, exam, documentation & further activities per the note.      Data     I have personally reviewed the following data over the past 24 hrs:    13.2 (H)  \   11.9   / 258     141 109 (H) 11.4 /  120 (H)   3.6 23 0.57 \     Procal: N/A CRP: 143.73 (H) Lactic Acid: N/A         Imaging results reviewed over the past 24 hrs:   No results found for this or any previous visit (from the past 24 hour(s)).

## 2023-12-27 NOTE — BRIEF OP NOTE
[facility]    Brief Operative Note    Pre-operative diagnosis: Cellulitis, unspecified cellulitis site [L03.90]  Infection of finger [L08.9]  Post-operative diagnosis:       Alexey pus in index finger, dorsal hand, thenar web space and carpal tunnel. Possible spider bite with focal necrosis and secondary infection    Procedure: Right index irrigation and debridement                           Index flexor sheath Irrigation and debridement                            I&D of flexor sheath                            Open Carpal tunnel  Surgeon: Surgeon(s) and Role:     * Mary Edwards MD - Primary  Anesthesia: General   Estimated Blood Loss: Less than 50 ml    Drains: 2 red vessel loops and one green irrigation tubing  Specimens:   ID Type Source Tests Collected by Time Destination   A : RIGHT INDEX FINGER Swab Finger, Right ANAEROBIC BACTERIAL CULTURE ROUTINE, AEROBIC BACTERIAL CULTURE ROUTINE Mary Edwards MD 12/26/2023  7:47 PM    B : RIGHT INDEX FINGER Swab Finger, Right FUNGAL OR YEAST CULTURE ROUTINE Mary Edwards MD 12/26/2023  8:35 PM      Findings:   As above .  Complications: None.  Implants: * No implants in log *

## 2023-12-27 NOTE — PLAN OF CARE
Occupational Therapy Discharge Summary    Reason for therapy discharge:    All goals and outcomes met, no further needs identified.    Progress towards therapy goal(s). See goals on Care Plan in [facility] electronic health record for goal details.  Goals met    Therapy recommendation(s):    Home with assist in strenuous I/ADls (home management tasks, laundry, childcare etc). OP hand therapy to address strengthening and ROM to increase IND in I/ADLs.

## 2023-12-28 ENCOUNTER — APPOINTMENT (OUTPATIENT)
Dept: OCCUPATIONAL THERAPY | Facility: CLINIC | Age: 28
DRG: 982 | End: 2023-12-28
Attending: PHYSICIAN ASSISTANT

## 2023-12-28 LAB
CREAT SERPL-MCNC: 0.59 MG/DL (ref 0.51–0.95)
CRP SERPL-MCNC: 83.5 MG/L
EGFRCR SERPLBLD CKD-EPI 2021: >90 ML/MIN/1.73M2
ERYTHROCYTE [DISTWIDTH] IN BLOOD BY AUTOMATED COUNT: 13.8 % (ref 10–15)
GLUCOSE BLDC GLUCOMTR-MCNC: 94 MG/DL (ref 70–99)
HCT VFR BLD AUTO: 37 % (ref 35–47)
HGB BLD-MCNC: 12 G/DL (ref 11.7–15.7)
MCH RBC QN AUTO: 29.9 PG (ref 26.5–33)
MCHC RBC AUTO-ENTMCNC: 32.4 G/DL (ref 31.5–36.5)
MCV RBC AUTO: 92 FL (ref 78–100)
PLATELET # BLD AUTO: 242 10E3/UL (ref 150–450)
RBC # BLD AUTO: 4.02 10E6/UL (ref 3.8–5.2)
VANCOMYCIN SERPL-MCNC: 15.1 UG/ML
WBC # BLD AUTO: 7.3 10E3/UL (ref 4–11)

## 2023-12-28 PROCEDURE — 250N000013 HC RX MED GY IP 250 OP 250 PS 637: Performed by: PHYSICIAN ASSISTANT

## 2023-12-28 PROCEDURE — 36415 COLL VENOUS BLD VENIPUNCTURE: CPT | Performed by: PHYSICIAN ASSISTANT

## 2023-12-28 PROCEDURE — 120N000001 HC R&B MED SURG/OB

## 2023-12-28 PROCEDURE — 97165 OT EVAL LOW COMPLEX 30 MIN: CPT | Mod: GO

## 2023-12-28 PROCEDURE — 82565 ASSAY OF CREATININE: CPT | Performed by: HOSPITALIST

## 2023-12-28 PROCEDURE — 258N000003 HC RX IP 258 OP 636: Performed by: PHYSICIAN ASSISTANT

## 2023-12-28 PROCEDURE — 250N000011 HC RX IP 250 OP 636: Performed by: PHYSICIAN ASSISTANT

## 2023-12-28 PROCEDURE — 99232 SBSQ HOSP IP/OBS MODERATE 35: CPT | Performed by: PHYSICIAN ASSISTANT

## 2023-12-28 PROCEDURE — 250N000013 HC RX MED GY IP 250 OP 250 PS 637: Performed by: HOSPITALIST

## 2023-12-28 PROCEDURE — 85027 COMPLETE CBC AUTOMATED: CPT | Performed by: PHYSICIAN ASSISTANT

## 2023-12-28 PROCEDURE — 86140 C-REACTIVE PROTEIN: CPT | Performed by: PHYSICIAN ASSISTANT

## 2023-12-28 PROCEDURE — 80202 ASSAY OF VANCOMYCIN: CPT | Performed by: HOSPITALIST

## 2023-12-28 PROCEDURE — 250N000013 HC RX MED GY IP 250 OP 250 PS 637: Performed by: INTERNAL MEDICINE

## 2023-12-28 PROCEDURE — 97110 THERAPEUTIC EXERCISES: CPT | Mod: GO

## 2023-12-28 PROCEDURE — 36415 COLL VENOUS BLD VENIPUNCTURE: CPT | Performed by: HOSPITALIST

## 2023-12-28 PROCEDURE — 99233 SBSQ HOSP IP/OBS HIGH 50: CPT | Performed by: HOSPITALIST

## 2023-12-28 RX ORDER — GABAPENTIN 100 MG/1
100 CAPSULE ORAL AT BEDTIME
Status: DISCONTINUED | OUTPATIENT
Start: 2023-12-28 | End: 2023-12-31 | Stop reason: HOSPADM

## 2023-12-28 RX ADMIN — ASPIRIN 81 MG: 81 TABLET, COATED ORAL at 19:47

## 2023-12-28 RX ADMIN — OXYCODONE HYDROCHLORIDE 5 MG: 5 TABLET ORAL at 09:04

## 2023-12-28 RX ADMIN — BENZONATATE 100 MG: 100 CAPSULE ORAL at 09:10

## 2023-12-28 RX ADMIN — AMPICILLIN SODIUM AND SULBACTAM SODIUM 3 G: 2; 1 INJECTION, POWDER, FOR SOLUTION INTRAMUSCULAR; INTRAVENOUS at 13:05

## 2023-12-28 RX ADMIN — AMPICILLIN SODIUM AND SULBACTAM SODIUM 3 G: 2; 1 INJECTION, POWDER, FOR SOLUTION INTRAMUSCULAR; INTRAVENOUS at 01:04

## 2023-12-28 RX ADMIN — PANTOPRAZOLE SODIUM 40 MG: 40 TABLET, DELAYED RELEASE ORAL at 07:10

## 2023-12-28 RX ADMIN — AMPICILLIN SODIUM AND SULBACTAM SODIUM 3 G: 2; 1 INJECTION, POWDER, FOR SOLUTION INTRAMUSCULAR; INTRAVENOUS at 07:10

## 2023-12-28 RX ADMIN — AMPICILLIN SODIUM AND SULBACTAM SODIUM 3 G: 2; 1 INJECTION, POWDER, FOR SOLUTION INTRAMUSCULAR; INTRAVENOUS at 19:43

## 2023-12-28 RX ADMIN — CALCIUM CARBONATE (ANTACID) CHEW TAB 500 MG 1000 MG: 500 CHEW TAB at 15:59

## 2023-12-28 RX ADMIN — TRAMADOL HYDROCHLORIDE 50 MG: 50 TABLET, FILM COATED ORAL at 07:32

## 2023-12-28 RX ADMIN — POLYETHYLENE GLYCOL 3350 17 G: 17 POWDER, FOR SOLUTION ORAL at 09:04

## 2023-12-28 RX ADMIN — VANCOMYCIN HYDROCHLORIDE 1250 MG: 10 INJECTION, POWDER, LYOPHILIZED, FOR SOLUTION INTRAVENOUS at 17:50

## 2023-12-28 RX ADMIN — GABAPENTIN 100 MG: 100 CAPSULE ORAL at 22:56

## 2023-12-28 RX ADMIN — VANCOMYCIN HYDROCHLORIDE 1250 MG: 10 INJECTION, POWDER, LYOPHILIZED, FOR SOLUTION INTRAVENOUS at 05:34

## 2023-12-28 RX ADMIN — ACETAMINOPHEN 650 MG: 325 TABLET, FILM COATED ORAL at 19:47

## 2023-12-28 RX ADMIN — ASPIRIN 81 MG: 81 TABLET, COATED ORAL at 09:05

## 2023-12-28 RX ADMIN — BENZONATATE 100 MG: 100 CAPSULE ORAL at 20:16

## 2023-12-28 RX ADMIN — BENZONATATE 100 MG: 100 CAPSULE ORAL at 01:41

## 2023-12-28 RX ADMIN — DOCUSATE SODIUM 50 MG AND SENNOSIDES 8.6 MG 1 TABLET: 8.6; 5 TABLET, FILM COATED ORAL at 09:05

## 2023-12-28 RX ADMIN — CALCIUM CARBONATE (ANTACID) CHEW TAB 500 MG 1000 MG: 500 CHEW TAB at 20:15

## 2023-12-28 ASSESSMENT — ACTIVITIES OF DAILY LIVING (ADL)
PREVIOUS_RESPONSIBILITIES: MEAL PREP;HOUSEKEEPING;LAUNDRY;SHOPPING;MEDICATION MANAGEMENT;FINANCES
ADLS_ACUITY_SCORE: 18

## 2023-12-28 NOTE — PHARMACY-VANCOMYCIN DOSING SERVICE
Pharmacy Vancomycin Note  Date of Service 2023  Patient's  1995   28 year old, female    Indication: Abscess  Day of Therapy: 3  Current vancomycin regimen:  1250 mg IV q12h  Current vancomycin monitoring method: AUC  Current vancomycin therapeutic monitoring goal: 400-600 mg*h/L    InsightRX Prediction of Current Vancomycin Regimen    Regimen: 1250 mg IV every 12 hours.  Start time: 17:34 on 2023  Exposure target: AUC24 (range)400-600 mg/L.hr   AUC24,ss: 497 mg/L.hr  Probability of AUC24 > 400: 90 %  Ctrough,ss: 13.2 mg/L  Probability of Ctrough,ss > 20: 3 %  Probability of nephrotoxicity (Lodise FADY ): 8 %    Current estimated CrCl = Estimated Creatinine Clearance: 133 mL/min (based on SCr of 0.59 mg/dL).    Creatinine for last 3 days  2023:  4:45 AM Creatinine 0.53 mg/dL;  7:21 AM Creatinine 0.56 mg/dL  2023: 12:51 PM Creatinine 0.57 mg/dL  2023:  2:28 PM Creatinine 0.59 mg/dL    Recent Vancomycin Levels (past 3 days)  2023:  2:28 PM Vancomycin 15.1 ug/mL    Vancomycin IV Administrations (past 72 hours)                     vancomycin (VANCOCIN) 1,250 mg in 0.9% NaCl 250 mL intermittent infusion (mg) 1,250 mg New Bag 23 0534     1,250 mg New Bag 23 1729     1,250 mg New Bag  0544     1,250 mg New Bag 23 1746    vancomycin (VANCOCIN) 1,250 mg in 0.9% NaCl 250 mL intermittent infusion (mg) 1,250 mg New Bag 23 0548                    Nephrotoxins and other renal medications (From now, onward)      Start     Dose/Rate Route Frequency Ordered Stop    23 1800  vancomycin (VANCOCIN) 1,250 mg in 0.9% NaCl 250 mL intermittent infusion         1,250 mg  over 90 Minutes Intravenous EVERY 12 HOURS 23 1344      23 1330  ampicillin-sulbactam (UNASYN) 3 g vial to attach to  mL bag         3 g  over 15-30 Minutes Intravenous EVERY 6 HOURS 23 1313                 Contrast Orders - past 72 hours (72h ago, onward)      None             Interpretation of levels and current regimen:  Vancomycin level is reflective of -600    Has serum creatinine changed greater than 50% in last 72 hours: No    Urine output:  unable to determine    Renal Function: Stable    I  Plan:  Continue Current Dose  Vancomycin monitoring method: AUC  Vancomycin therapeutic monitoring goal: 400-600 mg*h/L  Pharmacy will check vancomycin levels as appropriate in 3-5 Days.  Serum creatinine levels will be ordered a minimum of twice weekly.    Kyle Locke RPH

## 2023-12-28 NOTE — PLAN OF CARE
Goal Outcome Evaluation:                      DATE & TIME: 12/27/2023 1800    Cognitive Concerns/ Orientation : AOx4, pleasant    BEHAVIOR & AGGRESSION TOOL COLOR: Green  CIWA SCORE: na   ABNL VS/O2: VSS, RA   MOBILITY: SBA  PAIN MANAGMENT: Med x2 for right hand pain, oxycodone 10mg made pt feel very dizzy. Tolerating tylenol and Tramadol.  DIET: Reg, appetite fair, restarted on prilosec today.  BOWEL/BLADDER: Continent of both  ABNL LAB/BG:  Blood cultures pending. WBC 13.2, .7.  DRAIN/DEVICES: L PIV , restarted per IV team.  TELEMETRY RHYTHM: na  SKIN: WDL ex few scattered bruises and old scars on abdomen  TESTS/PROCEDURES:NA  D/C DAY/GOALS/PLACE: Pending  OTHER IMPORTANT INFO:  Pt recently immigrated to the  from [facility] less than a month, pt prefers no male caregivers.  at bedside.The index finger has a AlumaFolam splint attached with coban to the dorsum of the finger to prevent early range of motion and dislodging the drains. The drains can be removed on Thursday morning. Pt came with loose cough, PRN Tessalon x2 which has helped.   Vancomycin IV q12, IV Unasyn q6hr.

## 2023-12-28 NOTE — PROGRESS NOTES
12/28/23 1410   Appointment Info   Signing Clinician's Name / Credentials (OT) Anurag Traore OTR/L   Rehab Comments (OT) Per MD: S/p right index finger/hand I&D and carpal tunnel I&D, instruct on finger ROM (including index finger as much as possible within dressing)   Living Environment   People in Home spouse;child(liza), dependent  (2 year old son)   Current Living Arrangements apartment   Home Accessibility no concerns   Transportation Anticipated family or friend will provide   Living Environment Comments Pt lives in apartment w/ spouse and 2 year old son. RUE dominant.h   Self-Care   Usual Activity Tolerance good   Current Activity Tolerance moderate   Activity/Exercise/Self-Care Comment Pt reports being IND w/ all ADL's at baseline.   Instrumental Activities of Daily Living (IADL)   Previous Responsibilities meal prep;housekeeping;laundry;shopping;medication management;finances   General Information   Onset of Illness/Injury or Date of Surgery 12/26/23   Referring Physician Shelia Marvin PA-C   Patient/Family Therapy Goal Statement (OT) Return to home   Additional Occupational Profile Info/Pertinent History of Current Problem Shweta Hilton is a markedly pleasant 28 year old woman who presents with pain, redness, and swelling of the right second finger and hand, and is found to have suspected acute right hand cellulitis.   Cognitive Status Examination   Orientation Status orientation to person, place and time   Pain Assessment   Patient Currently in Pain Yes, see Vital Sign flowsheet  (8/10 R wrist/hand)   Strength Comprehensive (MMT)   Comment, General Manual Muscle Testing (MMT) Assessment Decreased RUE finger strength   Clinical Impression   Criteria for Skilled Therapeutic Interventions Met (OT) Yes, treatment indicated   OT Diagnosis Decreased IND w/ I/ADL's   Influenced by the following impairments Decreased IND w/ I/ADL's.   OT Problem List-Impairments impacting ADL problems related  to;activity tolerance impaired;range of motion (ROM);pain   Assessment of Occupational Performance 1-3 Performance Deficits   Identified Performance Deficits Decreased independence in I/ADls. (Dressing, bathing, toileting, childcare)   Planned Therapy Interventions (OT) ADL retraining;IADL retraining;ROM;transfer training;home program guidelines   Clinical Decision Making Complexity (OT) problem focused assessment/low complexity   Risk & Benefits of therapy have been explained evaluation/treatment results reviewed;care plan/treatment goals reviewed;risks/benefits reviewed;current/potential barriers reviewed;patient   OT Total Evaluation Time   OT Eval, Low Complexity Minutes (03429) 10   OT Goals   Therapy Frequency (OT) One time eval and treatment   OT Predicted Duration/Target Date for Goal Attainment 12/28/23   OT: Goal 1 Pt will be able to demonstrate safe technique w/ RUE AROM exercises while maintaining precautions to increase IND w/ I/ADL's.  (Goal met)   Interventions   Interventions Quick Adds Therapeutic Procedures/Exercise   Therapeutic Procedures/Exercise   Therapeutic Procedure: strength, endurance, ROM, flexibillity minutes (65879) 15   Symptoms Noted During/After Treatment fatigue;increased pain   Treatment Detail/Skilled Intervention Pt greeted supine in bed and agreeable for OT evaluation. Pt w/ R wrist/hand wrapped in bandaging and coban. Pt reporting 8/10 while at rest. Issued pt with handout regarding R wrist/digit AROM exercises to be completed. Writer demonstrated the following exercises to pt: grasp and release, digit abduction/adduction, wrist flex/ext and wrist UD/RD. Pt able to demonstrate safe technique by completing 3x reps of each exercise w/ minimal AROM d/t pain. Instructed pt to complete exercises 3x a day and pt verbalized understanding. Increased time spent at end of session discussing outpatient hand therapy and return to typing/handwriting. Pt appreciative of discussion. Pt w/ no  additional questions or concerns at this time. Pt remained supine in bed at end of therapy session w/ all needs met.   OT Discharge Planning   OT Plan Discharge OT   OT Discharge Recommendation (DC Rec) home with assist;home with outpatient occupational therapy   OT Rationale for DC Rec Home with assist in strenuous I/ADls (home management tasks, laundry, childcare etc). OP hand therapy to address strengthening and ROM to increase IND in I/ADLs.   OT Brief overview of current status See above   Total Session Time   Timed Code Treatment Minutes 15   Total Session Time (sum of timed and untimed services) 25

## 2023-12-28 NOTE — PLAN OF CARE
DATE & TIME: 12/27/2023 [address]    Cognitive Concerns/ Orientation : AOx4, pleasant    BEHAVIOR & AGGRESSION TOOL COLOR: Green  CIWA SCORE: na   ABNL VS/O2: VSS ex soft BP's overnight, RA 98%  MOBILITY: SBA  PAIN MANAGMENT: Pt requested x1 Oxy even though it made the pt feel dizzy in the day time-pain relieved. Slept good throughout the night.    DIET: Reg, appetite fair, restarted on prilosec today.  BOWEL/BLADDER: Continent of both  ABNL LAB/BG:  Blood cultures completed, see result. BS 94. Am labs pending  DRAIN/DEVICES: L PIV   TELEMETRY RHYTHM: na  SKIN: WDL ex few scattered bruises and old scars on abdomen  TESTS/PROCEDURES:NA  D/C DAY/GOALS/PLACE: Pending  OTHER IMPORTANT INFO:  Pt recently immigrated to the  from [facility] less than a month, pt prefers no male caregivers.  at bedside.The index finger has a AlumaFolam splint attached with coban to the dorsum of the finger to prevent early range of motion and dislodging the drains. The drains can be removed on Thursday morning. Pt came with loose cough, PRN Tessalon x1 given overnight-some relief.  Vancomycin IV q12, IV Unasyn q6hr.

## 2023-12-28 NOTE — PLAN OF CARE
Goal Outcome Evaluation:       DATE & TIME: 12/28/23 3856-6701    Cognitive Concerns/ Orientation : AOx4  BEHAVIOR & AGGRESSION TOOL COLOR: Green  CIWA SCORE: na   ABNL VS/O2: VSS ex soft BP's at times, especially after Oxy given  MOBILITY: SBA  PAIN MANAGMENT: Pt requested x1 Oxy even though it made the pt feel dizzy- pain relieved.   DIET: Reg, appetite fair, encouraged fluids   BOWEL/BLADDER: Continent of both, had Bm today per pt   ABNL LAB/BG:  Blood cultures completed, see result. BS 94. CRP improving 83.50  DRAIN/DEVICES: L PIV x2  TELEMETRY RHYTHM: na  SKIN: WDL ex few scattered bruises and old scars on abdomen  TESTS/PROCEDURES:NA  D/C DAY/GOALS/PLACE: Pending cultures, antibiotic plan, and pain control  OTHER IMPORTANT INFO:  Pt recently immigrated to the  from [facility] less than a month, pt prefers no male caregivers.  at bedside.  Dressing changed per MD this shift of right hand with drains removed, Pt has dry cough, PRN Tessalon x1 given for cough this shift,  Vancomycin IV q12, IV Unasyn q6hr.

## 2023-12-28 NOTE — PLAN OF CARE
Occupational Therapy Discharge Summary    Reason for therapy discharge:    All goals and outcomes met, no further needs identified.    Progress towards therapy goal(s). See goals on Care Plan in [facility] electronic health record for goal details.  Goals met    Therapy recommendation(s):    Continued therapy is recommended.  Rationale/Recommendations: Patient is currently functioning below baseline following procedure to dominant RUE. Patient would benefit from continued therapy in outpatient hand setting to improve strength and range of motion.

## 2023-12-28 NOTE — PROGRESS NOTES
[facility]    Infectious Disease Progress Note    Date of Service (when I saw the patient): 12/28/2023     Assessment & Plan   Shweta Hilotn is a 28 year old who was admitted on 12/26/2023.     Impression:  29 yo female coming in with pain redness and swelling on the right hand that started one week prior to admission and progressively worsening   No known trauma   S/p I&D of right index finger, right index finger flexor sheath, and right carpal tunnel 12/26/23. OR cultures with staph aureus thus far.   On vancomycin and Unasyn.     Recommendations:   Continue on vancomycin pending operative culture sensitivities. Also on Unasyn pending final culture results to cover possible anaerobes.  Recommendations for antibiotics on discharge pending final culture results.     Patient and plan discussed with Dr. Perez.     Mary Beth Luciano PA-C    Interval History   Tolerating antibiotics ok   No new rashes or issues with antibiotics.  Significant pain after dressing change today.   Labs reviewed   No changes to past medical, social or family history         Physical Exam   Temp: 98  F (36.7  C) Temp src: Oral BP: 132/82 Pulse: 71   Resp: 16 SpO2: 100 % O2 Device: None (Room air)    Vitals:    12/26/23 0119 12/26/23 1341   Weight: 60 kg (132 lb 4.4 oz) 58.8 kg (129 lb 10.1 oz)     Vital Signs with Ranges  Temp:  [98  F (36.7  C)-98.4  F (36.9  C)] 98  F (36.7  C)  Pulse:  [66-89] 71  Resp:  [16-18] 16  BP: ()/(47-82) 132/82  SpO2:  [95 %-100 %] 100 %    Constitutional: Awake, alert, cooperative, no apparent distress  Lungs: Clear to auscultation bilaterally, no crackles or wheezing  Cardiovascular: Regular rate and rhythm, normal S1 and S2, and no murmur noted  Abdomen: Normal bowel sounds, soft, non-distended, non-tender  Skin: No rashes, no cyanosis. Right hand swollen in dressing. Did not remove dressing.   Other:    Medications    lactated ringers Stopped (12/27/23 0424)    sodium chloride  Stopped (12/27/23 0400)      ampicillin-sulbactam  3 g Intravenous Q6H    aspirin  81 mg Oral BID    pantoprazole  40 mg Oral QAM AC    polyethylene glycol  17 g Oral Daily    senna-docusate  1 tablet Oral BID    sodium chloride (PF)  3 mL Intracatheter Q8H    vancomycin  1,250 mg Intravenous Q12H       Data   All microbiology laboratory data reviewed.  Recent Labs   Lab Test 12/28/23  0911 12/27/23  1251 12/26/23  0721   WBC 7.3 13.2* 9.7   HGB 12.0 11.9 13.2   HCT 37.0 36.9 39.4   MCV 92 90 90    258 236     Recent Labs   Lab Test 12/27/23  1251 12/26/23  0721 12/26/23  0445   CR 0.57 0.56 0.53     Recent Labs   Lab Test 12/26/23  0721   SED 6     CRP Inflammation   Date Value Ref Range Status   12/28/2023 83.50 (H) <5.00 mg/L Final      Collected Updated Procedure Result Status    12/26/2023 2351 12/27/2023 1432 Blood Culture Arm, Left [[id number]]    Blood from Arm, Left    Preliminary result Component Value   Culture No growth after 12 hours P             12/26/2023 2322 12/27/2023 1432 Blood Culture Arm, Left [[id number]]    Blood from Arm, Left    Preliminary result Component Value   Culture No growth after 12 hours P             12/26/2023 1947 12/26/2023 2002 Anaerobic Bacterial Culture Routine [[id number]]   Abscess from Finger, Right    In process Component Value   No component results             12/26/2023 1947 12/27/2023 1409 Abscess Aerobic Bacterial Culture Routine [[id number]]   (Abnormal)   Abscess from Finger, Right    Preliminary result Component Value   Culture Culture in progress P    3+ Staphylococcus aureus Abnormal  P             12/26/2023 1947 12/26/2023 2207 Fungal or Yeast Culture Routine [[id number]]   Abscess from Finger, Right    In process Component Value   No component results

## 2023-12-28 NOTE — PROGRESS NOTES
Orthopedic Surgery  Shweta Hilton  12/28/2023     Admit Date:  12/26/2023    POD: 2 Days Post-Op   Procedure(s):  Right Index Finger Irrigation and debridement OF FLEXOR SHEATH AND OPEN CARPAL TUNNEL, AND THENAR WEB SPACE     Patient resting comfortably in bed.   Pain remains in hand, minimally improved from yesterday.  Reports benefit from low dose (2.5 mg) of oxycodone for pain. Notes less dizziness.   Interested in something to help with nerve pain.  Describes swelling and tingling to all digits, but worst in the index finger.  Denies subjective fever and chills.  Tolerating oral intake.    Afebrile, VSS aside from hypotension.  No acute events overnight.    Temp:  [98  F (36.7  C)-98.2  F (36.8  C)] 98  F (36.7  C)  Pulse:  [64-89] 64  Resp:  [16-18] 16  BP: ()/(51-82) 91/55  SpO2:  [95 %-100 %] 100 %    Alert and oriented. NAD.  Right hand dressing is clean, dry, and intact.   Dressings removed and reveals two intact drains and surgical side to the palmar aspect of the hand ranging from the index finger to the wrist. Minimal bloody drainage. Sutures intact.  Surrounding erythema present.   Mild to moderate edema to all digits, worst in the 2nd finger.  Able to gently range 3rd-5th fingers and thumb, but limited due to swelling and pain.  Sensation intact but paresthesias noted in all digits.  Brisk cap refill.  Radial pulse 2+.    Labs:  Recent Labs   Lab Test 12/28/23  0911 12/27/23  1251 12/26/23  0721   WBC 7.3 13.2* 9.7   HGB 12.0 11.9 13.2    258 236     No lab results found.  Recent Labs   Lab Test 12/28/23  0911 12/27/23  1251 12/26/23  0721   CRPI 83.50* 143.73* 60.30*     Right hand intra-op cultures: 3+ Staph aureus (susceptibilities pending)    1. Plan:  -SCDs/mobilization for DVT prophylaxis.  -Antibiotics per ID. Currently vancomycin and Unasyn.  -Occupational therapy consult placed to instruct the patient on digital ROM. Okay to range right index finger and wrist as well in dressing.  Patient will need outpatient hand therapy at discharge.   -No direct pressure to the palmar wrist/hand. Avoid lifting more than 1-2 pounds with the right hand.   -Continue pain regimen. Seems to be tolerating decreased dose of oxycodone better with improvement in pain, albeit temporary. Tramadol discontinued. Hospitalist to add 100 mg gabapentin QHS today.   -Elevate above chest height when at rest. I repositioned the patient today for adequate elevation.   -I removed the patient's drains and changed her dressing. Starting tomorrow (12/29/23), RN to complete daily dressing changes and Hibiclens soaks. Orders placed.   -Follow up with Dr. Martina Edwards/Milly Gutierrez PA-C next week (week of 1/1/24).    2. Disposition:  -Anticipate d/c to home with possible home infusion pending susceptibilities vs PO antibiotics and outpatient hand therapy when medically stable.    Shelia Marvin PA-C  [facility]

## 2023-12-28 NOTE — PROGRESS NOTES
[facility]    Medicine Progress Note - Hospitalist Service    Date of Admission:  12/26/2023    Assessment & Plan    Shweta Hilton is a markedly pleasant 28 year old woman who presents with pain, redness, and swelling of the right second finger and hand, and is found to have suspected acute right hand cellulitis.     PLAN      Acute right hand cellulitis:     Of note, Ms. Hilton recently moved here from [facility]. She presents with acute pain, redness, and swelling of the right second finger and hand.   In the ED, she is afebrile, without hypotension, tachycardia or hypoxia. WBC is normal. She has a large blister at the lateral right second finger base, as well as fluctuant edema in the thenar space. Overall, her symptoms are consistent with right hand and 2nd finger cellulitis, with possible abscess, which could be staphylococcal given its purulence. Acute flexor tenosynovitis or septic arthritis are on the differential.     Plan  -- Orthopedics consulted.  Input appreciated.  Status post right index finger I&D of flexor/sheath and open carpal tunnel, and thenar webspace.  --Per ortho this is likely a spider bite with focal area of necrosis and secondary infection.  -- Infectious disease consulted.  Input appreciated.  Continue vancomycin and Unasyn pending culture and sensitivities.  --Continue to follow perioperative cultures.  --Pain control and IV fluids..  -- Will start at low-dose gabapentin nightly for nerve pain.          Diet: Regular Diet Adult    DVT Prophylaxis: Pneumatic Compression Devices  Slaughter Catheter: Not present  Lines: None     Cardiac Monitoring: None  Code Status: Full Code      Clinically Significant Risk Factors                          # Moderate Malnutrition: based on nutrition assessment, PRESENT ON ADMISSION   # Financial/Environmental Concerns: none         Disposition Plan      Expected Discharge Date: 12/31/2023      Destination: home with family               Veronica Humphreys MD  Hospitalist Service  [facility]  Securely message with Genny (more info)  Text page via [employer] Paging/Directory   ______________________________________________________________________    Interval History   Continues to endorse pain in right hand.    Physical Exam   Vital Signs: Temp: 98  F (36.7  C) Temp src: Oral BP: 91/55 Pulse: 64   Resp: 16 SpO2: 100 % O2 Device: None (Room air)    Weight: 129 lbs 10.09 oz    General Appearance: Well appearing for stated age.  Respiratory: CTAB, no rales or ronchi  Cardiovascular: S1, S2 normal, no murmurs  GI: non-tender on palpation, BS present      Medical Decision Making       55 MINUTES SPENT BY ME on the date of service doing chart review, history, exam, documentation & further activities per the note.      Data     I have personally reviewed the following data over the past 24 hrs:    7.3  \   12.0   / 242     N/A N/A N/A /  94   N/A N/A 0.59 \     Procal: N/A CRP: 83.50 (H) Lactic Acid: N/A         Imaging results reviewed over the past 24 hrs:   No results found for this or any previous visit (from the past 24 hour(s)).

## 2023-12-29 ENCOUNTER — HOME INFUSION (PRE-WILLOW HOME INFUSION) (OUTPATIENT)
Dept: PHARMACY | Facility: CLINIC | Age: 28
End: 2023-12-29

## 2023-12-29 PROCEDURE — 99232 SBSQ HOSP IP/OBS MODERATE 35: CPT | Performed by: INTERNAL MEDICINE

## 2023-12-29 PROCEDURE — 250N000013 HC RX MED GY IP 250 OP 250 PS 637: Performed by: PHYSICIAN ASSISTANT

## 2023-12-29 PROCEDURE — 99232 SBSQ HOSP IP/OBS MODERATE 35: CPT | Performed by: PHYSICIAN ASSISTANT

## 2023-12-29 PROCEDURE — 258N000003 HC RX IP 258 OP 636: Performed by: PHYSICIAN ASSISTANT

## 2023-12-29 PROCEDURE — 250N000013 HC RX MED GY IP 250 OP 250 PS 637: Performed by: HOSPITALIST

## 2023-12-29 PROCEDURE — 120N000001 HC R&B MED SURG/OB

## 2023-12-29 PROCEDURE — 250N000011 HC RX IP 250 OP 636: Performed by: PHYSICIAN ASSISTANT

## 2023-12-29 PROCEDURE — 250N000013 HC RX MED GY IP 250 OP 250 PS 637: Performed by: INTERNAL MEDICINE

## 2023-12-29 RX ORDER — ACETAMINOPHEN 325 MG/1
650 TABLET ORAL EVERY 6 HOURS PRN
Qty: 60 TABLET | Refills: 0 | Status: SHIPPED | OUTPATIENT
Start: 2023-12-29

## 2023-12-29 RX ORDER — AMOXICILLIN 250 MG
1 CAPSULE ORAL 2 TIMES DAILY PRN
Qty: 14 TABLET | Refills: 0 | Status: SHIPPED | OUTPATIENT
Start: 2023-12-29

## 2023-12-29 RX ORDER — OXYCODONE HYDROCHLORIDE 5 MG/1
2.5-5 TABLET ORAL EVERY 4 HOURS PRN
Qty: 15 TABLET | Refills: 0 | Status: SHIPPED | OUTPATIENT
Start: 2023-12-29

## 2023-12-29 RX ORDER — GABAPENTIN 100 MG/1
100 CAPSULE ORAL AT BEDTIME
Qty: 30 CAPSULE | Refills: 0 | Status: SHIPPED | OUTPATIENT
Start: 2023-12-29 | End: 2024-01-28

## 2023-12-29 RX ORDER — HYDROXYZINE HYDROCHLORIDE 25 MG/1
25 TABLET, FILM COATED ORAL EVERY 6 HOURS PRN
Qty: 25 TABLET | Refills: 0 | Status: SHIPPED | OUTPATIENT
Start: 2023-12-29

## 2023-12-29 RX ORDER — POLYETHYLENE GLYCOL 3350 17 G/17G
17 POWDER, FOR SOLUTION ORAL DAILY
Qty: 510 G | Refills: 0 | Status: SHIPPED | OUTPATIENT
Start: 2023-12-29

## 2023-12-29 RX ORDER — ASPIRIN 81 MG/1
81 TABLET ORAL 2 TIMES DAILY
Qty: 60 TABLET | Refills: 0 | Status: SHIPPED | OUTPATIENT
Start: 2023-12-29 | End: 2024-01-28

## 2023-12-29 RX ADMIN — ANTISEPTIC SURGICAL SCRUB: 0.04 SOLUTION TOPICAL at 13:00

## 2023-12-29 RX ADMIN — VANCOMYCIN HYDROCHLORIDE 1250 MG: 10 INJECTION, POWDER, LYOPHILIZED, FOR SOLUTION INTRAVENOUS at 05:41

## 2023-12-29 RX ADMIN — VANCOMYCIN HYDROCHLORIDE 1250 MG: 10 INJECTION, POWDER, LYOPHILIZED, FOR SOLUTION INTRAVENOUS at 18:12

## 2023-12-29 RX ADMIN — GABAPENTIN 100 MG: 100 CAPSULE ORAL at 21:22

## 2023-12-29 RX ADMIN — BENZONATATE 100 MG: 100 CAPSULE ORAL at 09:29

## 2023-12-29 RX ADMIN — AMPICILLIN SODIUM AND SULBACTAM SODIUM 3 G: 2; 1 INJECTION, POWDER, FOR SOLUTION INTRAMUSCULAR; INTRAVENOUS at 08:14

## 2023-12-29 RX ADMIN — ONDANSETRON 4 MG: 4 TABLET, ORALLY DISINTEGRATING ORAL at 22:32

## 2023-12-29 RX ADMIN — PANTOPRAZOLE SODIUM 40 MG: 40 TABLET, DELAYED RELEASE ORAL at 09:25

## 2023-12-29 RX ADMIN — ACETAMINOPHEN 650 MG: 325 TABLET, FILM COATED ORAL at 21:22

## 2023-12-29 RX ADMIN — BENZONATATE 100 MG: 100 CAPSULE ORAL at 21:22

## 2023-12-29 RX ADMIN — ASPIRIN 81 MG: 81 TABLET, COATED ORAL at 21:22

## 2023-12-29 RX ADMIN — AMPICILLIN SODIUM AND SULBACTAM SODIUM 3 G: 2; 1 INJECTION, POWDER, FOR SOLUTION INTRAMUSCULAR; INTRAVENOUS at 01:15

## 2023-12-29 RX ADMIN — ASPIRIN 81 MG: 81 TABLET, COATED ORAL at 09:24

## 2023-12-29 RX ADMIN — OXYCODONE HYDROCHLORIDE 2.5 MG: 5 TABLET ORAL at 09:25

## 2023-12-29 RX ADMIN — CALCIUM CARBONATE (ANTACID) CHEW TAB 500 MG 1000 MG: 500 CHEW TAB at 22:32

## 2023-12-29 ASSESSMENT — ACTIVITIES OF DAILY LIVING (ADL)
ADLS_ACUITY_SCORE: 18

## 2023-12-29 NOTE — PLAN OF CARE
DATE & TIME: 12/28/23 1930-0730    Cognitive Concerns/ Orientation : AOx4  BEHAVIOR & AGGRESSION TOOL COLOR: Green  CIWA SCORE: na   ABNL VS/O2: VSS ex soft BP's at times, especially after Oxy given  MOBILITY: SBA  PAIN MANAGMENT: PRN tylenol given for pain-some relief, slept good throughout the night with little to no pain.   DIET: Reg, appetite fair, encouraged fluids   BOWEL/BLADDER: Continent of both, x1 bm this shift  ABNL LAB/BG:  Blood cultures completed, see result.  CRP improving 83.50  DRAIN/DEVICES: L PIV x2  TELEMETRY RHYTHM: na  SKIN: WDL ex few scattered bruises and old scars on abdomen  TESTS/PROCEDURES:NA  D/C DAY/GOALS/PLACE: Pending  OTHER IMPORTANT INFO:  Pt recently immigrated to the  from [facility] less than a month, pt prefers no male caregivers.  at bedside. Pt has dry cough, PRN Tessalon x1 given for cough this shift,  Vancomycin IV q12, IV Unasyn q6hr.

## 2023-12-29 NOTE — PROGRESS NOTES
[facility]    Received request for benefit check should pt require home IV abx. Unfortunately, Shweta does not have active health insurance so the drug and nursing will be out of pocket.     Based on vancomycin 1,250mg q12 the total cost is $154.98 per day for drug and supplies. Nursing is an additional $90 per visit .    Referral also sent to [facility] for comparative pricing per care coordinatorJulio's request. [facility] was given stn 66 care coordinator cell phone number and they will call that number with self-pay quote.    Thank you     Alia Daugherty RN  [facility] Liaison  [phone] (Mon thru Fri 8am - 5pm)  [phone] Office

## 2023-12-29 NOTE — PLAN OF CARE
Goal Outcome Evaluation:       DATE & TIME: 12/29/23 6941-4612   Cognitive Concerns/ Orientation : AOx4  BEHAVIOR & AGGRESSION TOOL COLOR: Green  CIWA SCORE: na   ABNL VS/O2: VSS ex soft BP's at times, especially after Oxy given  MOBILITY: Independent in room, ambulating   PAIN MANAGMENT: PRN Oxy 2.5 mg given x1  DIET: Reg, appetite fair, encouraged fluids   BOWEL/BLADDER: Continent of both, x1 bm this shift  ABNL LAB/BG:  Blood cultures completed, see result.  CRP improving 83.50  DRAIN/DEVICES: L PIV x1, order for PICC placement-will need Vancomyocin at discharge  TELEMETRY RHYTHM: na  SKIN: WDL ex few scattered bruises and old scars on abdomen, right hand soaked and dressing changed this shift-sutures intact.   TESTS/PROCEDURES:NA  D/C DAY/GOALS/PLACE: Pending 1-2 days with home antibiotics  OTHER IMPORTANT INFO:  Pt recently immigrated to the  from [facility] less than a month, pt prefers no male caregivers.  at bedside. Pt has dry cough, PRN Tessalon x1 given for cough this shift,  Vancomycin IV q12, Unasyn discontinued.  Demonstrated dressing change to  this shift.  Pt very hesitant about getting PICC line and states she wants to take oral antibiotics at discharge, pt educated.

## 2023-12-29 NOTE — PROGRESS NOTES
No coverage pt must agree to self pay.   Therapy: IV Vanco 1250mg Q12H      Pt [facility] pricing came out to $154.98 per day for drug and supplies. If nursing is required it's an additional $90 per visit    In reference to referral from [facility] on pt admitted 12/26/23 to check for IV ABX coverage.    Please contact Intake with any questions, [phone] or In Basket pool, [facility] ([id number]).

## 2023-12-29 NOTE — PROGRESS NOTES
[facility]    Medicine Progress Note - Hospitalist Service    Date of Admission:  12/26/2023    Assessment & Plan     Shweta Hilton is a markedly pleasant 28 year old woman who presents with pain, redness, and swelling of the right second finger and hand, and is found to have suspected acute right hand cellulitis.     PLAN      Acute right hand cellulitis:      Of note, Ms. Hilton recently moved here from [facility]. She presents with acute pain, redness, and swelling of the right second finger and hand.   In the ED, she is afebrile, without hypotension, tachycardia or hypoxia. WBC is normal. She has a large blister at the lateral right second finger base, as well as fluctuant edema in the thenar space. Overall, her symptoms are consistent with right hand and 2nd finger cellulitis, with possible abscess, which could be staphylococcal given its purulence. Acute flexor tenosynovitis or septic arthritis are on the differential.     Plan  -- Orthopedics consulted.  Input appreciated.  Status post right index finger I&D of flexor/sheath and open carpal tunnel, and thenar webspace.follow up recommended  --Per ortho this is likely a spider bite with focal area of necrosis and secondary infection.  -- Infectious disease consulted.  Input appreciated.  Continue vancomycin IV  --discontinue IV Unasyn on 12/29  --pt will need IV Vancomycin with PICC line placement prior to discontinue per ID recommendations  --  low-dose gabapentin nightly for nerve pain.  --consult to  for discharge planning        Diet: Regular Diet Adult    DVT Prophylaxis: Pneumatic Compression Devices  Slaughter Catheter: Not present  Lines: None     Cardiac Monitoring: None  Code Status: Full Code      Clinically Significant Risk Factors                          # Moderate Malnutrition: based on nutrition assessment, PRESENT ON ADMISSION   # Financial/Environmental Concerns: none         Disposition Plan      Expected Discharge Date:  01/02/2024      Destination: home with family              Antonio Herndon  Hospitalist Service  [facility]  Securely message with Genny (more info)  Text page via [employer] Paging/Directory   ______________________________________________________________________    Interval History   Patient is resting in bed, offers no complaints of chest pain dizziness lightheadedness nausea vomiting abdominal pain fever chills overnight dressing in place at the site of I&D in the right hand.  No family at bedside, discussed about possibility of needing IV antibiotics at discharge and patient is very hopeful that she gets to discharge on oral antibiotic.    Physical Exam   Vital Signs: Temp: 98.6  F (37  C) Temp src: Oral BP: 110/69 Pulse: 71   Resp: 18 SpO2: 100 % O2 Device: None (Room air)    Weight: 129 lbs 10.09 oz    Constitutional: Awake, alert, cooperative, no apparent distress  Respiratory: Clear to auscultation bilaterally, no crackles or wheezing  Cardiovascular: Regular rate and rhythm, normal S1 and S2, and no murmur noted  GI: Normal bowel sounds, soft, non-distended, non-tender  Skin/Integumen: Right hand dressing in place, no oozing  Other: Alert oriented x 3, no neurological deficits found.    Medical Decision Making

## 2023-12-29 NOTE — PROGRESS NOTES
Orthopedic Surgery  Shweta Hilton  12/29/2023     Admit Date:  12/26/2023    POD: 3 Days Post-Op   Procedure(s):  Right Index Finger Irrigation and debridement OF FLEXOR SHEATH AND OPEN CARPAL TUNNEL, AND THENAR WEB SPACE     Patient resting comfortably in bed.   Reports frustration and anxiety around current circumstances, but feels she is slowly improving and on the right path.  Pain remains in hand, overall slowly improving.  Reports benefit with oxycodone 2.5 mg and low-dose gabapentin.  Describes swelling and tingling to all digits, but worst in the index and long fingers.  Denies subjective fever and chills.  Tolerating oral intake.    Afebrile, VSS aside from hypotension.  No acute events overnight.    Temp:  [97.7  F (36.5  C)-98.6  F (37  C)] 98.6  F (37  C)  Pulse:  [64-71] 71  Resp:  [16-18] 18  BP: ()/(55-72) 110/69  SpO2:  [98 %-100 %] 100 %    Alert and oriented. NAD. Non-toxic appearing.  Right hand dressing is clean, dry, and intact.   No erythema outside of dressing. No lymphangitis.  Mild bruising to volar forearm.  Mild to moderate edema to all exposed digits.  Able to gently range 3rd-5th fingers and thumb, but limited due to swelling and pain.  Sensation intact but paresthesias noted in all digits.  Brisk cap refill.  Unable to effectively palpate peripheral pulses due to dressing.    Labs:  Recent Labs   Lab Test 12/28/23  0911 12/27/23  1251 12/26/23  0721   WBC 7.3 13.2* 9.7   HGB 12.0 11.9 13.2    258 236     No lab results found.  Recent Labs   Lab Test 12/28/23  0911 12/27/23  1251 12/26/23  0721   CRPI 83.50* 143.73* 60.30*     Right hand intra-op cultures: 3+ Staph aureus (resistant to erythromycin, oxacillin, and tetracycline)    1. Plan:  -SCDs/mobilization and ASA 81 mg BID for DVT prophylaxis.  -Antibiotics per ID. Currently on vancomycin.  -Occupational therapy has been consulted.  -No direct pressure to the palmar wrist/hand. Avoid lifting more than 1-2 pounds with  the right hand.   -Continue pain regimen. Seems to be tolerating decreased dose of oxycodone better with improvement in pain, albeit temporary. Bedtime gabapentin 100 mg seems slightly helpful. PRN hydroxyzine.   -Continue to aggressively elevate above chest height when at rest.  -Starting today (12/30/23), RN to complete daily dressing changes and Hibiclens soaks. Orders previously placed and discussed with RN today. Patient will continue daily soaks and dressing changes at home.  -Follow up with Dr. Martina Edwards/Milly Gutierrez PA-C next week (week of 1/1/24).    2. Disposition:  -Anticipate d/c to home with possible home infusion pending vs PO antibiotics pending ID recommendations and outpatient hand therapy when medically stable. Okay to discharge from an orthopedic standpoint.    Shelia Marvin PA-C  [facility]

## 2023-12-29 NOTE — PROGRESS NOTES
[facility]    Infectious Disease Progress Note    Date of Service (when I saw the patient): 12/29/2023     Assessment & Plan   Shweta Hilton is a 28 year old who was admitted on 12/26/2023.     Impression:  29 yo female coming in with pain redness and swelling on the right hand that started one week prior to admission and progressively worsening   No known trauma   S/p I&D of right index finger, right index finger flexor sheath, and right carpal tunnel 12/26/23. OR cultures with MRSA.  On vancomycin and Unasyn.     Recommendations:   Continue on vancomycin. Will need 3-4 weeks of IV antibiotics. Will get weekly labs and determine exact duration based on lab results. Orders placed in discharge navigator.   PICC to be placed - ordered.   Stop Unasyn.     Patient and plan discussed with Dr. Perez.     Mary Beth Luciano PA-C    Interval History   Tolerating antibiotics ok   No new rashes or issues with antibiotics. She did have some itching with Vanco when administered quickly, tolerates when infusion is slow.   Very worried about PICC placement.   Labs reviewed   No changes to past medical, social or family history         Physical Exam   Temp: 98.6  F (37  C) Temp src: Oral BP: 110/69 Pulse: 71   Resp: 18 SpO2: 100 % O2 Device: None (Room air)    Vitals:    12/26/23 0119 12/26/23 1341   Weight: 60 kg (132 lb 4.4 oz) 58.8 kg (129 lb 10.1 oz)     Vital Signs with Ranges  Temp:  [97.7  F (36.5  C)-98.6  F (37  C)] 98.6  F (37  C)  Pulse:  [64-71] 71  Resp:  [16-18] 18  BP: ()/(55-72) 110/69  SpO2:  [98 %-100 %] 100 %    Constitutional: Awake, alert, cooperative, no apparent distress  Lungs: Clear to auscultation bilaterally, no crackles or wheezing  Cardiovascular: Regular rate and rhythm, normal S1 and S2, and no murmur noted  Abdomen: Normal bowel sounds, soft, non-distended, non-tender  Skin: No rashes, no cyanosis. Right hand swollen in dressing. Did not remove dressing.    Other:    Medications    lactated ringers Stopped (12/27/23 0424)    sodium chloride Stopped (12/27/23 0400)      ampicillin-sulbactam  3 g Intravenous Q6H    aspirin  81 mg Oral BID    chlorhexidine   Topical Daily    gabapentin  100 mg Oral At Bedtime    pantoprazole  40 mg Oral QAM AC    polyethylene glycol  17 g Oral Daily    senna-docusate  1 tablet Oral BID    sodium chloride (PF)  3 mL Intracatheter Q8H    vancomycin  1,250 mg Intravenous Q12H       Data   All microbiology laboratory data reviewed.  Recent Labs   Lab Test 12/28/23  0911 12/27/23  1251 12/26/23  0721   WBC 7.3 13.2* 9.7   HGB 12.0 11.9 13.2   HCT 37.0 36.9 39.4   MCV 92 90 90    258 236     Recent Labs   Lab Test 12/28/23  1428 12/27/23  1251 12/26/23  0721   CR 0.59 0.57 0.56     Recent Labs   Lab Test 12/26/23  0721   SED 6     CRP Inflammation   Date Value Ref Range Status   12/28/2023 83.50 (H) <5.00 mg/L Final      Collected Updated Procedure Result Status    12/26/2023 2351 12/29/2023 0232 Blood Culture Arm, Left [[id number]]    Blood from Arm, Left    Preliminary result Component Value   Culture No growth after 2 days P             12/26/2023 2322 12/29/2023 0232 Blood Culture Arm, Left [[id number]]    Blood from Arm, Left    Preliminary result Component Value   Culture No growth after 2 days P             12/26/2023 1947 12/29/2023 0016 Anaerobic Bacterial Culture Routine [[id number]]   Abscess from Finger, Right    Preliminary result Component Value   Culture No anaerobic organisms isolated after 2 days P             12/26/2023 1947 12/29/2023 0705 Abscess Aerobic Bacterial Culture Routine [[id number]]    (Abnormal)   Abscess from Finger, Right    Preliminary result Component Value   Culture 3+ Staphylococcus aureus MRSA Abnormal  P       Susceptibility     Staphylococcus aureus MRSA     OSEI (Preliminary)     Clindamycin 0.25 ug/mL Susceptible 1     Daptomycin 1 ug/mL Susceptible     Erythromycin >=8 ug/mL Resistant      Gentamicin <=0.5 ug/mL Susceptible     Linezolid 2 ug/mL Susceptible     Oxacillin >=4 ug/mL Resistant 2     Tetracycline >=16 ug/mL Resistant     Trimethoprim/Sulfamethoxazole <=0.5/9.5 u... Susceptible     Vancomycin 1 ug/mL Susceptible              1 This isolate DOES NOT demonstrate inducible clindamycin resistance in vitro. Clindamycin is susceptible and could be used when indicated, however, erythromycin is resistant and should not be used.   2 Oxacillin susceptible isolates are susceptible to cephalosporins (example: cefazolin and cephalexin) and beta lactam combination agents. Oxacillin resistant isolates are resistant to these agents.        Susceptibility Comments    Staphylococcus aureus MRSA   Additional susceptibilities in progress.    MRSA requires contact precautions.         12/26/2023 1947 12/29/2023 0016 Fungal or Yeast Culture Routine [[id number]]   Abscess from Finger, Right    Preliminary result Component Value   Culture No growth after 2 days P

## 2023-12-29 NOTE — PROGRESS NOTES
Care Management Follow Up        Received a quote from [facility] [phone]  Based on vancomycin 1,250mg q12 the total cost is $ 217.19 per WEEK   for drug and supplies. Nursing is an additional $130 for the first 2 hrs and $65 each additional hour     Julio Oreilly RN -[phone]

## 2023-12-29 NOTE — PROGRESS NOTES
Care Management Follow Up    Length of Stay (days): 3    Expected Discharge Date: 01/02/2024     Concerns to be Addressed: adjustment to diagnosis/illness, discharge planning     Patient plan of care discussed at interdisciplinary rounds: Yes    Anticipated Discharge Disposition: Home     Anticipated Discharge Services: None  Anticipated Discharge DME: None    Patient/family educated on Medicare website which has current facility and service quality ratings:    Education Provided on the Discharge Plan: Yes  Patient/Family in Agreement with the Plan: yes    Referrals Placed by CM/SW: Scheduled Follow-up appointments  Private pay costs discussed:  For Antibiotics    Additional Information:  Long discussion with patient and spouse regarding IV antibiotics at home. Spouse Nabil reluctant to have patient get a PICC line today as he feels he would need to consult with medical team from his community to see if there are alternative measures. Patient and spouse understands the importance of having the Antibiotic recommended. They did ask for another  day to think over this information.  Discussed with patient and spouse regarding out of pocket expenses for the AB at home and they are in agreement. Spouse stated patient does not have Social security yet and is in process,only then will patient be able to be enrolled with spouse for insurance.   I notified of home IVAB. Await call back with cost.     Julio Oreilly RN ml [phone]

## 2023-12-30 LAB
BACTERIA ABSC ANAEROBE+AEROBE CULT: ABNORMAL
CREAT SERPL-MCNC: 0.52 MG/DL (ref 0.51–0.95)
CRP SERPL-MCNC: 22.55 MG/L
EGFRCR SERPLBLD CKD-EPI 2021: >90 ML/MIN/1.73M2

## 2023-12-30 PROCEDURE — 86140 C-REACTIVE PROTEIN: CPT | Performed by: PHYSICIAN ASSISTANT

## 2023-12-30 PROCEDURE — 36415 COLL VENOUS BLD VENIPUNCTURE: CPT | Performed by: PHYSICIAN ASSISTANT

## 2023-12-30 PROCEDURE — 250N000013 HC RX MED GY IP 250 OP 250 PS 637: Performed by: HOSPITALIST

## 2023-12-30 PROCEDURE — 258N000003 HC RX IP 258 OP 636: Performed by: PHYSICIAN ASSISTANT

## 2023-12-30 PROCEDURE — 120N000001 HC R&B MED SURG/OB

## 2023-12-30 PROCEDURE — 99232 SBSQ HOSP IP/OBS MODERATE 35: CPT | Performed by: INTERNAL MEDICINE

## 2023-12-30 PROCEDURE — 250N000013 HC RX MED GY IP 250 OP 250 PS 637: Performed by: PHYSICIAN ASSISTANT

## 2023-12-30 PROCEDURE — 250N000011 HC RX IP 250 OP 636: Performed by: PHYSICIAN ASSISTANT

## 2023-12-30 PROCEDURE — 82565 ASSAY OF CREATININE: CPT | Performed by: PHYSICIAN ASSISTANT

## 2023-12-30 RX ADMIN — ASPIRIN 81 MG: 81 TABLET, COATED ORAL at 21:19

## 2023-12-30 RX ADMIN — ACETAMINOPHEN 650 MG: 325 TABLET, FILM COATED ORAL at 09:56

## 2023-12-30 RX ADMIN — ACETAMINOPHEN 650 MG: 325 TABLET, FILM COATED ORAL at 21:19

## 2023-12-30 RX ADMIN — DOCUSATE SODIUM 50 MG AND SENNOSIDES 8.6 MG 1 TABLET: 8.6; 5 TABLET, FILM COATED ORAL at 09:56

## 2023-12-30 RX ADMIN — ANTISEPTIC SURGICAL SCRUB: 0.04 SOLUTION TOPICAL at 11:27

## 2023-12-30 RX ADMIN — GABAPENTIN 100 MG: 100 CAPSULE ORAL at 21:19

## 2023-12-30 RX ADMIN — VANCOMYCIN HYDROCHLORIDE 1250 MG: 10 INJECTION, POWDER, LYOPHILIZED, FOR SOLUTION INTRAVENOUS at 21:19

## 2023-12-30 RX ADMIN — PANTOPRAZOLE SODIUM 40 MG: 40 TABLET, DELAYED RELEASE ORAL at 09:56

## 2023-12-30 RX ADMIN — CALCIUM CARBONATE (ANTACID) CHEW TAB 500 MG 1000 MG: 500 CHEW TAB at 21:19

## 2023-12-30 RX ADMIN — VANCOMYCIN HYDROCHLORIDE 1250 MG: 10 INJECTION, POWDER, LYOPHILIZED, FOR SOLUTION INTRAVENOUS at 10:44

## 2023-12-30 RX ADMIN — ASPIRIN 81 MG: 81 TABLET, COATED ORAL at 09:56

## 2023-12-30 ASSESSMENT — ACTIVITIES OF DAILY LIVING (ADL)
ADLS_ACUITY_SCORE: 18
ADLS_ACUITY_SCORE: 22
ADLS_ACUITY_SCORE: 18

## 2023-12-30 NOTE — PROGRESS NOTES
"Care Management Follow Up    Length of Stay (days): 4    Expected Discharge Date: 01/02/2024     Concerns to be Addressed: adjustment to diagnosis/illness, discharge planning     Patient plan of care discussed at interdisciplinary rounds: Yes    Anticipated Discharge Disposition: Home     Anticipated Discharge Services: home infusion  Anticipated Discharge DME: None    Patient/family educated on Medicare website which has current facility and service quality ratings:    Education Provided on the Discharge Plan: Yes  Patient/Family in Agreement with the Plan: yes    Referrals Placed by CM/SW: Scheduled Follow-up appointments  Private pay costs discussed:  reviewed the private pay costs associated with IV Antibiotics.  \"a quote from [facility] [phone]  Based on vancomycin 1,250mg q12 the total cost is $ 217.19 per WEEK   for drug and supplies. Nursing is an additional $130 for the first 2 hrs and $65 each additional hour\"    \"[facility]     Received request for benefit check should pt require home IV abx. Unfortunately, Shweta does not have active health insurance so the drug and nursing will be out of pocket.      Based on vancomycin 1,250mg q12 the total cost is $154.98 per day for drug and supplies. Nursing is an additional $90 per visit .\"    Additional Information:  Follow up done with patient regarding cost of IV antibiotics.    1534 patient agreed to PICC line and private pay costs and has elected [facility] services. Message sent to provider and to Home infusion services.     Lana Swan RN   [facility]   Phone [phone] or [phone]     "

## 2023-12-30 NOTE — PLAN OF CARE
Goal Outcome Evaluation:       DATE & TIME: 12/30/23 [id number]  Cognitive Concerns/ Orientation : A&O x4  BEHAVIOR & AGGRESSION TOOL COLOR: Green  ABNL VS/O2: VSS on RA   MOBILITY: Independent in room, ambulating frequently. Elevating R hand when in bed.  PAIN MANAGMENT: Pt reports pain is improving. PRN Tylenol givenx1  DIET: Reg, appetite fair, encouraged fluids   BOWEL/BLADDER: Continent  ABNL LAB/BG: Blood cultures completed, see result. CRP improving 22.55  DRAIN/DEVICES: New IV placed this shift for Iv Vanco, pt still refusing PICC after being educated  SKIN: WDL ex few scattered bruises and old scars on abdomen. Right hand soak completed and dressing changed this shift.  TESTS/PROCEDURES: NA  D/C DAY/GOALS/PLACE: Pending abx plan, see below  OTHER IMPORTANT INFO:  Pt recently immigrated to the  from [facility] less than a month. Pt prefers no male caregivers.

## 2023-12-30 NOTE — PLAN OF CARE
Goal Outcome Evaluation:      Plan of Care Reviewed With: patient, spouse        DATE & TIME: 12/29/23 [id number]  Cognitive Concerns/ Orientation : A&O x4  BEHAVIOR & AGGRESSION TOOL COLOR: Green  ABNL VS/O2: VSS on RA ex soft BP  MOBILITY: Independent in room, ambulating frequently. Elevating R hand when in bed.  PAIN MANAGMENT: Pt reports pain is improving. PRN Tylenol given at bedtime  DIET: Reg, appetite fair, encouraged fluids   BOWEL/BLADDER: Continent. Refused stool softener, had BM today. Gastric reflux and nausea, given Zofran and Tums  ABNL LAB/BG: Blood cultures completed, see result. CRP improving 83.50  DRAIN/DEVICES: PIV removed per pt request due to discomfort, refusing new placement until the morning. Consult placed for IV team to place new IV & educate pt on PICC  SKIN: WDL ex few scattered bruises and old scars on abdomen. Right hand dressing CDI  TESTS/PROCEDURES: NA  D/C DAY/GOALS/PLACE: Pending abx plan, see below  OTHER IMPORTANT INFO: Intermittent dry cough, PRN Tessalon given. Pt and  are not comfortable with pt getting a PICC placed. Writer spent significant amount of time (45 min+) educating pt and . MD to discuss with pt in the morning about what her options are if she chooses to go a no PICC route.     Pt recently immigrated to the  from [facility] less than a month. Pt prefers no male caregivers.

## 2023-12-30 NOTE — PROGRESS NOTES
Orthopedic Surgery  Shweta Hilton  12/30/2023     Admit Date:  12/26/2023    POD: 4 Days Post-Op   Procedure(s):  Right Index Finger Irrigation and debridement OF FLEXOR SHEATH AND OPEN CARPAL TUNNEL, AND THENAR WEB SPACE     Patient resting in bed  Reports frustration and anxiety around current circumstances. Refusal of PICC today   Pain remains in hand, overall slowly improving.  Describes swelling and tingling to all digits, but worst in the index and long fingers. Improvement in long finger today.   Denies subjective fever and chills.  Tolerating oral intake.    No new orthopedic complaints today     Temp:  [97.5  F (36.4  C)-98.3  F (36.8  C)] 97.5  F (36.4  C)  Pulse:  [61-75] 75  Resp:  [14-18] 16  BP: ()/(54-75) 109/75  SpO2:  [98 %-100 %] 100 %    Alert and oriented. Non-toxic appearing.  Right hand dressing is clean, dry, and intact.   No erythema outside of dressing. No lymphangitis.  Mild bruising to volar forearm.  Mild to moderate edema to all exposed digits.  Able to gently range 3rd-5th fingers and thumb, but limited due to swelling and pain.  Sensation intact   Brisk cap refill.      Labs:  Recent Labs   Lab Test 12/28/23  0911 12/27/23  1251 12/26/23  0721   WBC 7.3 13.2* 9.7   HGB 12.0 11.9 13.2    258 236     No lab results found.  Recent Labs   Lab Test 12/30/23  1126 12/28/23  0911 12/27/23  1251   CRPI 22.55* 83.50* 143.73*     Right hand intra-op cultures: 3+ Staph aureus (resistant to erythromycin, oxacillin, and tetracycline)    1. Plan:  -SCDs/mobilization and ASA 81 mg BID for DVT prophylaxis.  -Antibiotics per ID. Currently on vancomycin.  -Occupational therapy has been consulted.  -No direct pressure to the palmar wrist/hand. Avoid lifting more than 1-2 pounds with the right hand.   -Continue pain regimen.   -Continue to aggressively elevate above chest height when at rest.  - RN to complete daily dressing changes and Hibiclens soaks.   -Follow up with Dr. Mack  Jerry/Milly Gutierrez PA-C next week (week of 1/1/24).    2. Disposition:  -Pending progress and abx regimen. Currently refusing PICC     Ainsley Baca PA-C  [facility]

## 2023-12-30 NOTE — PROGRESS NOTES
[facility]    Medicine Progress Note - Hospitalist Service    Date of Admission:  12/26/2023    Assessment & Plan   Shweta Hilton is a markedly pleasant 28 year old woman who presents with pain, redness, and swelling of the right second finger and hand, and is found to have suspected acute right hand cellulitis.     PLAN      Acute right hand cellulitis:      Of note, Ms. Hilton recently moved here from [facility]. She presents with acute pain, redness, and swelling of the right second finger and hand.   In the ED, she is afebrile, without hypotension, tachycardia or hypoxia. WBC is normal. She has a large blister at the lateral right second finger base, as well as fluctuant edema in the thenar space. Overall, her symptoms are consistent with right hand and 2nd finger cellulitis, with possible abscess, which could be staphylococcal given its purulence. Acute flexor tenosynovitis or septic arthritis are on the differential.     Plan  -- Orthopedics consulted.  Input appreciated.  Status post right index finger I&D of flexor/sheath and open carpal tunnel, and thenar webspace.follow up recommended  --Per ortho this is likely a spider bite with focal area of necrosis and secondary infection.  -- Infectious disease consulted.  Input appreciated.  Continue vancomycin IV  --discontinue IV Unasyn on 12/29  --pt will need IV Vancomycin with PICC line placement prior to discharge per ID recommendations  --Patient declining PICC line basement at this time as she is worried about its placement and long-term implications, discussed in detail about no significant alternative options including peripheral IV since it is long-term antibiotic plan.  Patient would like to talk to .  --  low-dose gabapentin nightly for nerve pain.  --consult to  for discharge planning  -- Discussion about management was held in detail and all questions were answered.   Unfortunately patient does not feel well  supported, as she continues to have pain and is worried that the infection will not get better without the antibiotics and there is no alternative for the IV antibiotics other than the PICC placement.     Diet: Regular Diet Adult    DVT Prophylaxis: Pneumatic Compression Devices  Slaughter Catheter: Not present  Lines: None     Cardiac Monitoring: None  Code Status: Full Code      Clinically Significant Risk Factors                          # Moderate Malnutrition: based on nutrition assessment    # Financial/Environmental Concerns: none         Disposition Plan     Expected Discharge Date: 01/02/2024      Destination: home with family              Antonio Herndon  Hospitalist Service  [facility]  Securely message with [employer] (more info)  Text page via [facility] Paging/Directory   ______________________________________________________________________    Interval History   Patient is resting in bed, continues to have pain in the right hand, at the site of I&D, no family at bedside no fever or chills overnight.  Patient is very anxious and distressed about having a PICC line placed.  Patient was requesting if there were alternative options in regards to IV antibiotic as outpatient bypassing the PICC line.  She would like to come to the clinic and have the antibiotic regimen placed through her peripheral IV if possible.  End of my conversation patient was very tearful, and felt like there was no help for her and is being given only 1 option which is the PICC line which she is not willing to get placed at this time.  Extensive discussion was held with patient, discussed with , to see if there is any other option for outpatient antibiotics but less likely at this time.    Physical Exam   Vital Signs: Temp: 97.5  F (36.4  C) Temp src: Axillary BP: 109/75 Pulse: 75   Resp: 16 SpO2: 100 % O2 Device: None (Room air)    Weight: 129 lbs 10.09 oz    Constitutional: Awake, alert, cooperative, no  apparent distress  Respiratory: Clear to auscultation bilaterally, no crackles or wheezing  Cardiovascular: Regular rate and rhythm, normal S1 and S2, and no murmur noted  GI: Normal bowel sounds, soft, non-distended, non-tender  Skin/Integumen: right hand soaked awaiting dressing   Other: tearful,anxious,no apparent focal deficits.    Medical Decision Making

## 2023-12-31 ENCOUNTER — HOSPITAL ENCOUNTER (EMERGENCY)
Facility: CLINIC | Age: 28
Discharge: HOME | End: 2023-12-31
Attending: EMERGENCY MEDICINE | Admitting: EMERGENCY MEDICINE

## 2023-12-31 ENCOUNTER — APPOINTMENT (OUTPATIENT)
Dept: GENERAL RADIOLOGY | Facility: CLINIC | Age: 28
End: 2023-12-31
Attending: EMERGENCY MEDICINE

## 2023-12-31 VITALS
SYSTOLIC BLOOD PRESSURE: 119 MMHG | DIASTOLIC BLOOD PRESSURE: 86 MMHG | OXYGEN SATURATION: 100 % | HEIGHT: 66 IN | WEIGHT: 129.63 LBS | BODY MASS INDEX: 20.83 KG/M2 | HEART RATE: 71 BPM | TEMPERATURE: 97.6 F | RESPIRATION RATE: 16 BRPM

## 2023-12-31 VITALS
OXYGEN SATURATION: 98 % | HEART RATE: 73 BPM | SYSTOLIC BLOOD PRESSURE: 126 MMHG | RESPIRATION RATE: 16 BRPM | DIASTOLIC BLOOD PRESSURE: 87 MMHG | TEMPERATURE: 98.1 F

## 2023-12-31 DIAGNOSIS — R00.2 PALPITATIONS: ICD-10-CM

## 2023-12-31 LAB
CRP SERPL-MCNC: 17.92 MG/L
VANCOMYCIN SERPL-MCNC: 14 UG/ML

## 2023-12-31 PROCEDURE — 36569 INSJ PICC 5 YR+ W/O IMAGING: CPT

## 2023-12-31 PROCEDURE — 250N000013 HC RX MED GY IP 250 OP 250 PS 637: Performed by: PHYSICIAN ASSISTANT

## 2023-12-31 PROCEDURE — 250N000011 HC RX IP 250 OP 636: Performed by: EMERGENCY MEDICINE

## 2023-12-31 PROCEDURE — 80202 ASSAY OF VANCOMYCIN: CPT | Performed by: INTERNAL MEDICINE

## 2023-12-31 PROCEDURE — 96365 THER/PROPH/DIAG IV INF INIT: CPT

## 2023-12-31 PROCEDURE — 99284 EMERGENCY DEPT VISIT MOD MDM: CPT | Mod: 25

## 2023-12-31 PROCEDURE — 99239 HOSP IP/OBS DSCHRG MGMT >30: CPT | Performed by: INTERNAL MEDICINE

## 2023-12-31 PROCEDURE — 258N000003 HC RX IP 258 OP 636: Performed by: EMERGENCY MEDICINE

## 2023-12-31 PROCEDURE — 250N000009 HC RX 250: Performed by: INTERNAL MEDICINE

## 2023-12-31 PROCEDURE — 71045 X-RAY EXAM CHEST 1 VIEW: CPT

## 2023-12-31 PROCEDURE — 272N000450 HC KIT 4FR POWER PICC SINGLE LUMEN

## 2023-12-31 PROCEDURE — 250N000011 HC RX IP 250 OP 636: Performed by: PHYSICIAN ASSISTANT

## 2023-12-31 PROCEDURE — 999N000040 HC STATISTIC CONSULT NO CHARGE VASC ACCESS

## 2023-12-31 PROCEDURE — 258N000003 HC RX IP 258 OP 636: Performed by: PHYSICIAN ASSISTANT

## 2023-12-31 PROCEDURE — 36415 COLL VENOUS BLD VENIPUNCTURE: CPT | Performed by: INTERNAL MEDICINE

## 2023-12-31 PROCEDURE — 96366 THER/PROPH/DIAG IV INF ADDON: CPT

## 2023-12-31 PROCEDURE — 86140 C-REACTIVE PROTEIN: CPT | Performed by: PHYSICIAN ASSISTANT

## 2023-12-31 PROCEDURE — 250N000013 HC RX MED GY IP 250 OP 250 PS 637: Performed by: HOSPITALIST

## 2023-12-31 PROCEDURE — 93005 ELECTROCARDIOGRAM TRACING: CPT | Mod: RTG

## 2023-12-31 RX ORDER — LIDOCAINE 40 MG/G
CREAM TOPICAL
Status: DISCONTINUED | OUTPATIENT
Start: 2023-12-31 | End: 2023-12-31 | Stop reason: HOSPADM

## 2023-12-31 RX ORDER — PANTOPRAZOLE SODIUM 40 MG/1
40 TABLET, DELAYED RELEASE ORAL
Qty: 30 TABLET | Refills: 0 | Status: SHIPPED | OUTPATIENT
Start: 2024-01-01

## 2023-12-31 RX ADMIN — CALCIUM CARBONATE (ANTACID) CHEW TAB 500 MG 1000 MG: 500 CHEW TAB at 08:41

## 2023-12-31 RX ADMIN — PANTOPRAZOLE SODIUM 40 MG: 40 TABLET, DELAYED RELEASE ORAL at 08:41

## 2023-12-31 RX ADMIN — CALCIUM CARBONATE (ANTACID) CHEW TAB 500 MG 1000 MG: 500 CHEW TAB at 15:27

## 2023-12-31 RX ADMIN — ONDANSETRON 4 MG: 2 INJECTION INTRAMUSCULAR; INTRAVENOUS at 08:41

## 2023-12-31 RX ADMIN — ASPIRIN 81 MG: 81 TABLET, COATED ORAL at 08:41

## 2023-12-31 RX ADMIN — VANCOMYCIN HYDROCHLORIDE 1250 MG: 10 INJECTION, POWDER, LYOPHILIZED, FOR SOLUTION INTRAVENOUS at 20:40

## 2023-12-31 RX ADMIN — ANTISEPTIC SURGICAL SCRUB: 0.04 SOLUTION TOPICAL at 12:54

## 2023-12-31 RX ADMIN — VANCOMYCIN HYDROCHLORIDE 1250 MG: 10 INJECTION, POWDER, LYOPHILIZED, FOR SOLUTION INTRAVENOUS at 11:09

## 2023-12-31 RX ADMIN — DOCUSATE SODIUM 50 MG AND SENNOSIDES 8.6 MG 1 TABLET: 8.6; 5 TABLET, FILM COATED ORAL at 08:46

## 2023-12-31 RX ADMIN — LIDOCAINE HYDROCHLORIDE 2 ML: 10 INJECTION, SOLUTION EPIDURAL; INFILTRATION; INTRACAUDAL; PERINEURAL at 10:25

## 2023-12-31 ASSESSMENT — ACTIVITIES OF DAILY LIVING (ADL)
ADLS_ACUITY_SCORE: 22
ADLS_ACUITY_SCORE: 35

## 2023-12-31 NOTE — PHARMACY-VANCOMYCIN DOSING SERVICE
Pharmacy Vancomycin Note  Date of Service 2023  Patient's  1995   28 year old, female    Indication: Abscess  Day of Therapy: 6  Current vancomycin regimen:  1250 mg IV q12h  Current vancomycin monitoring method: AUC  Current vancomycin therapeutic monitoring goal: 400-600 mg*h/L    [employer] Prediction of Current Vancomycin Regimen  Loading dose: N/A  Regimen: 1250 mg IV every 12 hours.  Start time: 09:19 on 2023  Exposure target: AUC24 (range)400-600 mg/L.hr   AUC24,ss: 461 mg/L.hr  Probability of AUC24 > 400: 83 %  Ctrough,ss: 12 mg/L  Probability of Ctrough,ss > 20: 0 %  Probability of nephrotoxicity (Lodise FADY ): 7 %    Current estimated CrCl = Estimated Creatinine Clearance: 150.8 mL/min (based on SCr of 0.52 mg/dL).    Creatinine for last 3 days  2023:  2:28 PM Creatinine 0.59 mg/dL  2023: 11:26 AM Creatinine 0.52 mg/dL    Recent Vancomycin Levels (past 3 days)  2023:  2:28 PM Vancomycin 15.1 ug/mL  2023:  8:10 AM Vancomycin 14.0 ug/mL    Vancomycin IV Administrations (past 72 hours)                     vancomycin (VANCOCIN) 1,250 mg in 0.9% NaCl 250 mL intermittent infusion (mg) 1,250 mg New Bag 23 2119     1,250 mg New Bag  1044     1,250 mg New Bag 23 1812     1,250 mg New Bag  0541     1,250 mg New Bag 23 1750                    Nephrotoxins and other renal medications (From now, onward)      Start     Dose/Rate Route Frequency Ordered Stop    23 1800  vancomycin (VANCOCIN) 1,250 mg in 0.9% NaCl 250 mL intermittent infusion         1,250 mg  over 90 Minutes Intravenous EVERY 12 HOURS 23 1344      23 0000  vancomycin (VANCOCIN) 1250 mg/250 mL IVPB        Note to Pharmacy: Pharmacy to dose based on AUC or levels    1,250 mg Intravenous EVERY 12 HOURS 23 1559 24 0075               Contrast Orders - past 72 hours (72h ago, onward)      None            Interpretation of levels and current  regimen:  Vancomycin level is reflective of -600  Has serum creatinine changed greater than 50% in last 72 hours: No  Urine output:  unable to determine  Renal Function: Stable      Plan:  Continue Current Dose  Vancomycin monitoring method: AUC  Vancomycin therapeutic monitoring goal: 400-600 mg*h/L  Pharmacy will check vancomycin levels as appropriate in 3-5 Days - prior to discharge as appropriate; current expected discharge date 1/2/24 per care coordination note.  Serum creatinine levels will be ordered a minimum of twice weekly.    Shilpa Field RPH

## 2023-12-31 NOTE — CONSULTS
Care Management Discharge Note    Discharge Date: 12/31/2023       Discharge Disposition: Home    Discharge Services: None    Discharge DME: None    Discharge Transportation: family or friend will provide    Private pay costs discussed:  [facility]    Does the patient's insurance plan have a 3 day qualifying hospital stay waiver?  No    PAS Confirmation Code:    Patient/family educated on Medicare website which has current facility and service quality ratings:      Education Provided on the Discharge Plan: Yes  Persons Notified of Discharge Plans: Discharging MD, bedside RN, [facility], Spouse  Patient/Family in Agreement with the Plan: yes    Handoff Referral Completed: Yes with [facility]    Additional Information:  Writer called [facility] intake patient is getting a PICC line placed, getting Vanco and most likely ready for discharge to home. Patient is Private pay and pending possible insurance 1/1/24 however not determined until [facility] is able to check on benefits after the new year.  Writer explained the above to patient's spouse Deion.   Deion is hopeful that the patient will have active coverage 1/1/24 and was informed to connect with [facility] for further information.  Deion confirmed address and phone numbers in EPIC are correct as listed.  Plan will be for possible discharge to home with [facility] services to start 12/31/23.    Addendum: 12/31 11:40  Writer called to check in with [facility].  They are checking on staffing availability for tonight.    Addendum: 12/31 12:16  Writer received a call from [facility]. They are in receipt of the home infusion order and are asking for the patient to discharge ~16:00 to allow for teach. Writer met with the patient and had the patient sign the cost estimate per [facility] request and faxed to [employer] [phone]  Patient to call her  spouse to have him pick her up ~14:30 updated bedside.  No further needs anticipated.    Edwige Funes RN, BSN, ACM   Care Transitions Specialist  [facility]  Care Transitions Specialist  [id number] [id number]  [email or web address]  Office: [phone] Fax: [phone]  [facility]

## 2023-12-31 NOTE — DISCHARGE SUMMARY
[facility]  Hospitalist Discharge Summary      Date of Admission:  12/26/2023  Date of Discharge:  12/31/2023  Discharging Provider: Antonio Herndon  Discharge Service: Hospitalist Service    Discharge Diagnoses   Hand abscess    Clinically Significant Risk Factors     # Moderate Malnutrition: based on nutrition assessment      Follow-ups Needed After Discharge   Follow-up Appointments     Follow-up and recommended labs and tests       Follow-up with Dr. Edwards/Milly Gutierrez PA-C ([facility]) at 5-7 days (the week of 1/1/24) postop for reevaluation. No   need for follow up labs or testing prior to this appointment.     Please contact Dr. Edwards's care coordinator, Presley, at [phone] to   schedule or for any questions related to your orthopedic injury/surgery.    [facility] [place] After Hours Phone: [phone]        Follow-up and recommended labs and tests       Follow up with primary care provider, Coleman Velasquez, within 7 days   for hospital follow- up.  The following labs/tests are recommended: acbc.              Unresulted Labs Ordered in the Past 30 Days of this Admission       Date and Time Order Name Status Description    12/26/2023  9:26 PM Fungal or Yeast Culture Routine Preliminary     12/26/2023  7:51 PM Anaerobic Bacterial Culture Routine Preliminary     12/26/2023  3:06 PM Blood Culture Arm, Left Preliminary     12/26/2023  3:06 PM Blood Culture Arm, Left Preliminary         These results will be followed up by PCP    Discharge Disposition   Discharged to home  Condition at discharge: Stable    Hospital Course   Shweta Hilton is a markedly pleasant 28 year old woman who presents with pain, redness, and swelling of the right second finger and hand, and is found to have suspected acute right hand cellulitis.     PLAN      Acute right hand cellulitis:      Of note, Ms. Hilton recently moved here from [facility]. She presents with acute pain,  redness, and swelling of the right second finger and hand.   In the ED, she is afebrile, without hypotension, tachycardia or hypoxia. WBC is normal. She has a large blister at the lateral right second finger base, as well as fluctuant edema in the thenar space. Overall, her symptoms are consistent with right hand and 2nd finger cellulitis, with possible abscess, which could be staphylococcal given its purulence. Acute flexor tenosynovitis or septic arthritis are on the differential.     Plan  -- Orthopedics consulted.  Status post right index finger I&D of flexor/sheath and open carpal tunnel, and thenar webspace.follow up recommended  --Per ortho this is likely a spider bite with focal area of necrosis and secondary infection.  -- Infectious disease consulted.   --discontinued IV Unasyn on 12/29  --pt will need IV Vancomycin with PICC line placement prior to discharge per ID recommendations as below  Will need 3-4 weeks of IV antibiotics. Will get weekly labs and determine exact duration based on lab results.   --  low-dose gabapentin 100 mg nightly for nerve pain.  Follow up in PCP clinic  PICC line placement in hospital  Home infusion at DC      Consultations This Hospital Stay   PHARMACY TO DOSE VANCO  ORTHOPEDIC SURGERY IP CONSULT  INFECTIOUS DISEASES IP CONSULT  PHARMACY TO DOSE VANCO  PHARMACY TO DOSE VANCO  CARE MANAGEMENT / SOCIAL WORK IP CONSULT  VASCULAR ACCESS ADULT IP CONSULT  INFECTIOUS DISEASES IP CONSULT  OCCUPATIONAL THERAPY ADULT IP CONSULT  VASCULAR ACCESS ADULT IP CONSULT  OCCUPATIONAL THERAPY ADULT IP CONSULT  VASCULAR ACCESS ADULT IP CONSULT  VASCULAR ACCESS ADULT IP CONSULT  PHYSICAL THERAPY ADULT IP CONSULT    Code Status   Full Code    Time Spent on this Encounter   I, Antonio eHrndon, personally saw the patient today and spent greater than 30 minutes discharging this patient.       Antonio Herndon  [place] [address] [address] [address] [address]  [address]  [address]  Phone: [phone]  ______________________________________________________________________    Physical Exam   Vital Signs: Temp: 97.6  F (36.4  C) Temp src: Oral BP: 119/86 Pulse: 71   Resp: 16 SpO2: 100 % O2 Device: None (Room air)    Weight: 129 lbs 10.09 oz  Constitutional: Awake, alert, cooperative, no apparent distress  Respiratory: Clear to auscultation bilaterally, no crackles or wheezing  Cardiovascular: Regular rate and rhythm, normal S1 and S2, and no murmur noted  GI: Normal bowel sounds, soft, non-distended, non-tender  Skin/Integumen: hand dressing in place  Other: no apparent focal deficits       Primary Care Physician   Coleman Velasquez    Discharge Orders      Home Care Referral      Occupational Therapy Referral      Home infusion referral      Home Infusion Referral      Home Care Referral      Reason for your hospital stay    S/p irrigation and debridement of right index finger dorsal and volar side, irrigation and debridement of right index finger flexor sheath, and irrigation and debridement of right carpal tunnel (DOS: 12/26/23) performed by Dr. Martina Edwards     Follow-up and recommended labs and tests     Follow-up with Dr. Edwards/Milly Gutierrez PA-C ([facility]) at 5-7 days (the week of 1/1/24) postop for reevaluation. No need for follow up labs or testing prior to this appointment.     Please contact Dr. Edwards's care coordinator, Presley, at [phone] to schedule or for any questions related to your orthopedic injury/surgery.    [facility] [place] After Hours Phone: [phone]     Activity    Your activity upon discharge: Encourage frequent ROM of right hand and all digits and wrist. Avoid direct pressure over palmar aspect of the hand and lifting more than 1-2 pounds.     Wound care and dressings    Instructions to care for your wound at home: Complete daily and PRN Hibiclens soaks (warm water with Hibiclens for 15 minutes at a times) and  dressing changes (Adaptic, gauze, Kerlix, and Ace wrap).     Discharge Instructions    Pain after surgery is normal and expected.  You will have some amount of pain and swelling for several weeks after surgery.  These symptoms will improve with time.  There are several things you can do to help reduce your pain including: rest, cold compresses, elevation, and using pain medications as needed. Contact your Surgeon Team if you have pain that persists or worsens after surgery despite rest, ice, elevation, and taking your medication(s) as prescribed. Contact your Surgeon Team if you have new numbness, tingling, or weakness in your operative extremity.    Swelling and/or bruising of the surgical extremity is common and may persist for several months after surgery. In addition to frequent icing and elevation, gentle compressive support with an ACE wrap or tubigrip may help with swelling. Apply compression regularly, removing at least twice daily to perform skin checks. Contact your Surgeon Team if your swelling increases and is NOT associated with an increase in your activity level, or if your swelling increases and is associated with redness and pain.    Ice can be used to control swelling and discomfort after surgery. Place a thin towel over your operative site and apply the ice pack overtop. Leave ice pack in place for 20 minutes, then remove for 20 minutes. Repeat this 20 minutes on/20 minutes off routine as often as tolerated.    Please contact your surgical team with any concerns for infection including increasing redness around your surgical site, pus-like drainage, fever, chills, or flu-like symptoms.     Reason for your hospital stay    MRSA infection of the hand     Follow-up and recommended labs and tests     Follow up with primary care provider, Coleman Velasquez, within 7 days for hospital follow- up.  The following labs/tests are recommended: acbc.     Activity    Your activity upon discharge: activity as  tolerated     Diet    Follow this diet upon discharge: Orders Placed This Encounter      Regular Diet Adult       Significant Results and Procedures   Results for orders placed or performed during the hospital encounter of 12/26/23   XR Hand Right G/E 3 Views    Narrative    EXAM: XR HAND RIGHT G/E 3 VIEWS  DATE/TIME: 12/26/2023 12:37 PM    INDICATION: Right index finger infection, eval for foreign body and  other acute pathology  COMPARISON: None available.       Impression    IMPRESSION: Soft tissue edema about the second digit, most pronounced  at the level of the metacarpophalangeal joint. No acute fracture or  evidence of osteomyelitis.       VANESSA AGUIRRE DO         SYSTEM ID:  [id number]       Discharge Medications   Current Discharge Medication List        START taking these medications    Details   aspirin 81 MG EC tablet Take 1 tablet (81 mg) by mouth 2 times daily for 30 days  Qty: 60 tablet, Refills: 0    Associated Diagnoses: Hand abscess      chlorhexidine (HIBICLENS) 4 % liquid Apply topically daily  Qty: 946 mL, Refills: 1    Associated Diagnoses: Hand abscess      gabapentin (NEURONTIN) 100 MG capsule Take 1 capsule (100 mg) by mouth at bedtime for 30 days  Qty: 30 capsule, Refills: 0    Associated Diagnoses: Hand abscess      hydrOXYzine HCl (ATARAX) 25 MG tablet Take 1 tablet (25 mg) by mouth every 6 hours as needed for other (adjuvant pain)  Qty: 25 tablet, Refills: 0    Associated Diagnoses: Hand abscess      oxyCODONE (ROXICODONE) 5 MG tablet Take 0.5-1 tablets (2.5-5 mg) by mouth every 4 hours as needed for moderate pain or moderate to severe pain (IF pain not managed with non-pharmacological and non-opioid interventions)  Qty: 15 tablet, Refills: 0    Associated Diagnoses: Hand abscess      pantoprazole (PROTONIX) 40 MG EC tablet Take 1 tablet (40 mg) by mouth every morning (before breakfast)  Qty: 30 tablet, Refills: 0    Associated Diagnoses: Hand abscess      polyethylene glycol (MIRALAX)  17 GM/Dose powder Take 17 g by mouth daily  Qty: 510 g, Refills: 0    Associated Diagnoses: Hand abscess      senna-docusate (SENOKOT-S/PERICOLACE) 8.6-50 MG tablet Take 1 tablet by mouth 2 times daily as needed for constipation  Qty: 14 tablet, Refills: 0    Associated Diagnoses: Hand abscess      vancomycin (VANCOCIN) 1250 mg/250 mL IVPB Inject 1,250 mg into the vein every 12 hours for 21 days Please check CBC with diff, AST, and CRP weekly and check creatinine and vanco levels twice weekly. Fax results to [facility].    Comments: Pharmacy to dose based on AUC or levels  Associated Diagnoses: Hand abscess           CONTINUE these medications which have CHANGED    Details   acetaminophen (TYLENOL) 325 MG tablet Take 2 tablets (650 mg) by mouth every 6 hours as needed for mild pain or other (and adjunct with moderate or severe pain or per patient request)  Qty: 60 tablet, Refills: 0    Associated Diagnoses: Hand abscess           CONTINUE these medications which have NOT CHANGED    Details   Biotin w/ Vitamins C & E (HAIR SKIN & NAILS GUMMIES PO) Take 1 chew tab by mouth daily           STOP taking these medications       esomeprazole (NEXIUM) 40 MG DR capsule Comments:   Reason for Stopping:             Allergies   Allergies   Allergen Reactions    Vancomycin Itching     *tolerated slower infusion* 12/26/23, reported itching following starting of IV infusion

## 2023-12-31 NOTE — PROCEDURES
[facility]    Single Lumen PICC Placement    Date/Time: 12/31/2023 10:30 AM    Performed by: Ailyn Bedoya RN  Authorized by: Antonio Herndon  Indications: vascular access      UNIVERSAL PROTOCOL   Site Marked: Yes  Prior Images Obtained and Reviewed:  Yes  Required items: Required blood products, implants, devices and special equipment available    Patient identity confirmed:  Verbally with patient, arm band and anonymous protocol, patient vented/unresponsive  NA - No sedation, light sedation, or local anesthesia  Confirmation Checklist:  Patient's identity using two indicators, relevant allergies, procedure was appropriate and matched the consent or emergent situation and correct equipment/implants were available  Time out: Immediately prior to the procedure a time out was called    Universal Protocol: the Joint Commission Universal Protocol was followed    Preparation: Patient was prepped and draped in usual sterile fashion    ESBL (mL):  2     ANESTHESIA    Local Anesthetic:  Lidocaine 1% without epinephrine  Anesthetic Total (mL):  2      SEDATION    Patient Sedated: No        Preparation: skin prepped with ChloraPrep  Skin prep agent: skin prep agent completely dried prior to procedure  Sterile barriers: maximum sterile barriers were used: cap, mask, sterile gown, sterile gloves, and large sterile sheet  Hand hygiene: hand hygiene performed prior to central venous catheter insertion  Type of line used: PICC  Catheter type: single lumen  Lumen type: power PICC  Catheter size: 4 Fr  Brand: Opara  Lot number: [id number]  Placement method: tip navigation system, venipuncture and ultrasound  Number of attempts: 1  Difficulty threading catheter: no  Successful placement: yes  Orientation: right    Location: basilic vein  Arm circumference: adults 10 cm  Extremity circumference: 27  Visible catheter length: 3  Total catheter length: 45  Dressing and securement: chlorhexidine disc applied,  occlusive dressing applied, blood cleaned with CHG, blood removed, alcohol impregnated caps, securement device and site cleansed  Post procedure assessment: blood return through all ports and placement verified by 3CG technology  PROCEDURE   Patient Tolerance:  Patient tolerated the procedure well with no immediate complicationsDescribe Procedure: Patient instruct on PICC placement. Patient verbalized an understanding. Patient gave verbal and written consent for PICC placement. VAT RN placed 4FR Single Lumen catheter in basilic vein. Patient tolerated well. Blood return noted. PICC placement was confirmed with 3 CG technology noting green highlights on rhythm strip. RN caring for patient updated that PICC is ok to use.   Disposal: sharps and needle count correct at the end of procedure, needles and guidewire disposed in sharps container

## 2023-12-31 NOTE — PLAN OF CARE
Goal Outcome Evaluation:  Discharge    Patient discharged to home via car with   Care plan note:  DATE & TIME: 12/31/23 5688-7293  Cognitive Concerns/ Orientation : A&O x4  BEHAVIOR & AGGRESSION TOOL COLOR: Green  ABNL VS/O2: VSS on RA   MOBILITY: Independent   PAIN MANAGMENT: Denied pain, just c/o of heartburn Tums givenx2  DIET: Reg  BOWEL/BLADDER: Continent.  ABNL LAB/BG: CRP 17.92  DRAIN/DEVICES: L PIV taken out. L PICC insertion.   SKIN: WDL ex few scattered bruises and old scars on abdomen. Right hand dressing changed, hand soaked per orders. Gave wound care supplies to patient.   TESTS/PROCEDURES: I&D was completed on 12/26  D/C DAY/GOALS/PLACE: Today 12/31/2023   OTHER IMPORTANT INFO: Pt recently immigrated to the  from [facility] less than a month ago. Pt prefered no male caregivers. Contact isolation for MRSA    Listed belongings gathered and given to patient (including from security/pharmacy). Yes  Care Plan and Patient education resolved: Yes  Prescriptions if needed, hard copies sent with patient  NA  Medication Bin checked and emptied on discharge Yes  SW/care coordinator/charge RN aware of discharge: Yes

## 2023-12-31 NOTE — PLAN OF CARE
Goal Outcome Evaluation:      Plan of Care Reviewed With: patient, spouse    Overall Patient Progress: improving    DATE & TIME: 12/30/23 0852-0900  Cognitive Concerns/ Orientation : A&O x4  BEHAVIOR & AGGRESSION TOOL COLOR: Green  ABNL VS/O2: VSS on RA   MOBILITY: Independent in room, ambulating frequently. Elevating R hand when in bed.  PAIN MANAGMENT: Pt reports R hand pain is improving. PRN Tylenol given x1  DIET: Reg, appetite fair, encouraged fluids   BOWEL/BLADDER: Continent. Tums given for heartburn  ABNL LAB/BG: Blood cultures completed, see result. CRP improving 22.55  DRAIN/DEVICES: L PIV SL with q12h Vanco. *PT IS NOW AGREEABLE TO GETTING A PICC LINE*  SKIN: WDL ex few scattered bruises and old scars on abdomen. Right hand dressing CDI  TESTS/PROCEDURES: I&D was completed on 12/26  D/C DAY/GOALS/PLACE: Potentially Sunday pending PICC placement and outpt abx setup  OTHER IMPORTANT INFO:  Pt recently immigrated to the  from [facility] less than a month. Pt prefers no male caregivers. Contact isolation for MRSA

## 2024-01-01 LAB
ATRIAL RATE - MUSE: 66 BPM
BACTERIA BLD CULT: NO GROWTH
BACTERIA BLD CULT: NO GROWTH
DIASTOLIC BLOOD PRESSURE - MUSE: NORMAL MMHG
INTERPRETATION ECG - MUSE: NORMAL
P AXIS - MUSE: -4 DEGREES
PR INTERVAL - MUSE: 120 MS
QRS DURATION - MUSE: 82 MS
QT - MUSE: 394 MS
QTC - MUSE: 413 MS
R AXIS - MUSE: 39 DEGREES
SYSTOLIC BLOOD PRESSURE - MUSE: NORMAL MMHG
T AXIS - MUSE: 43 DEGREES
VENTRICULAR RATE- MUSE: 66 BPM

## 2024-01-01 NOTE — ED PROVIDER NOTES
History     Chief Complaint:  Vascular Access Problem       HPI   Shweta Hilton is a 28 year old female presents emergency department with her significant other for evaluation of a couple heavy heartbeats that she has had occasionally since being discharged from the hospital today.  She reports she notices them when she is laying on her back or leaning leaning forward at times she gets 1 or 2 heavy beats.  Was recently hospitalized for a skin infection and discharged from the hospital with vancomycin infusions ordered at home, patient has a PICC line in place on the left.      Independent Historian:   Spouse/Partner - They report as above    Review of External Notes:   Reviewed hospital discharge summary from today      Medications:    acetaminophen (TYLENOL) 325 MG tablet  aspirin 81 MG EC tablet  Biotin w/ Vitamins C & E (HAIR SKIN & NAILS GUMMIES PO)  chlorhexidine (HIBICLENS) 4 % liquid  gabapentin (NEURONTIN) 100 MG capsule  hydrOXYzine HCl (ATARAX) 25 MG tablet  oxyCODONE (ROXICODONE) 5 MG tablet  [START ON 2024] pantoprazole (PROTONIX) 40 MG EC tablet  polyethylene glycol (MIRALAX) 17 GM/Dose powder  senna-docusate (SENOKOT-S/PERICOLACE) 8.6-50 MG tablet  vancomycin (VANCOCIN) 1250 mg/250 mL IVPB        Past Medical History:    Past Medical History:   Diagnosis Date    Eye infection     Peptic ulcer        Past Surgical History:    Past Surgical History:   Procedure Laterality Date     SECTION      IRRIGATION AND DEBRIDEMENT FINGER, COMBINED N/A 2023    Procedure: Right Index Finger Irrigation and debridement OF FLEXOR SHEATH AND OPEN CARPAL TUNNEL, AND THENAR WEB SPACE;  Surgeon: Mary Edwards MD;  Location:  OR        Physical Exam   Patient Vitals for the past 24 hrs:   BP Temp Temp src Pulse Resp SpO2   23 1831 126/87 98.1  F (36.7  C) Temporal 73 18 98 %        Physical Exam  Gen: well appearing, in no acute distress  Oral : Mucous membranes moist,   Nose: No  rhinorhea  Ears: External near normal, without drainage  Eyes: periorbital tissues and sclera normal   Neck: supple, no abnormal swelling  Lungs: Clear bilaterally, no tachypnea or distress, speaks full sentences  CV: Regular rate, regular rhythm  Ext: no lower extremity edema  Skin: warm, dry, well perfused, no rashes/bruising/lesions on exposed skin, PICC line in place on the left without erythema or tenderness  Neuro: alert, no gross motor or sensory deficits,   Psych: pleasant mood, normal affect      Emergency Department Course   ECG  EKG shows sinus rate 66, NV interval 120, QRS duration 82, QTc 413, no ST segment changes or T inversions concerning for acute ischemia    Imaging:  XR Chest 1 View   Final Result   IMPRESSION: Left PICC tip in the low SVC. Clear lungs. No pneumothorax. Normal heart size and pulmonary vascularity.             Laboratory:  Labs Ordered and Resulted from Time of ED Arrival to Time of ED Departure - No data to display     Procedures       Emergency Department Course & Assessments:             Interventions:  Medications   vancomycin (VANCOCIN) 1,250 mg in 0.9% NaCl 250 mL intermittent infusion (has no administration in time range)        Assessments:      Independent Interpretation (X-rays, CTs, rhythm strip):  None    Consultations/Discussion of Management or Tests:  None        Social Determinants of Health affecting care:   None    Disposition:  The patient was discharged to home.     Impression & Plan    CMS Diagnoses:     Medical Decision Making:  Patient presents with what sounds like palpitations, EKG normal did not detect any obvious arrhythmia.  She certainly been under a lot of stress lately with her recent hospitalization and we discussed how that can make the palpitations feel worse.  Her vital signs are within normal limits.  X-ray shows good placement of the PICC line.  Unfortunately she did miss her evening dose of vancomycin so we are supplementing that for her in the  emergency department so she does not get behind.  Not seeing any evidence of arrhythmia, would anticipate palpitations to resolve on their own.  Discussed with patient and they felt comfortable with plan.      Diagnosis:    ICD-10-CM    1. Palpitations  R00.2            Discharge Medications:  New Prescriptions    No medications on file          Tim Garcia MD  12/31/2023   Tim Garcia,*       Tim Garcia MD  12/31/23 2042

## 2024-01-01 NOTE — ED TRIAGE NOTES
"Pt discharged today with PICC line for treatment with vancomycin for MRSA in arm after surgery. Pt listed vanco as an allergy, but only if \"it's given too fast\". Pt was going to have infusion with home nurse this evening but reports her heart \"knocks\" (beats hard) when she is laying on her back or side so home nurse suggested pt get chest xray to make sure placement is good.      Triage Assessment (Adult)       Row Name 12/31/23 4561          Triage Assessment    Airway WDL WDL        Respiratory WDL    Respiratory WDL WDL        Skin Circulation/Temperature WDL    Skin Circulation/Temperature WDL WDL        Cardiac WDL    Cardiac WDL WDL        Peripheral/Neurovascular WDL    Peripheral Neurovascular WDL WDL        Cognitive/Neuro/Behavioral WDL    Cognitive/Neuro/Behavioral WDL WDL                     "

## 2024-01-03 LAB — BACTERIA ABSC ANAEROBE+AEROBE CULT: NORMAL

## 2024-01-04 ENCOUNTER — LAB REQUISITION (OUTPATIENT)
Dept: LAB | Facility: CLINIC | Age: 29
End: 2024-01-04

## 2024-01-04 DIAGNOSIS — L03.113 CELLULITIS OF RIGHT UPPER LIMB: ICD-10-CM

## 2024-01-04 LAB
AST SERPL W P-5'-P-CCNC: 27 U/L (ref 0–45)
BASOPHILS # BLD AUTO: 0 10E3/UL (ref 0–0.2)
BASOPHILS NFR BLD AUTO: 1 %
CREAT SERPL-MCNC: 0.53 MG/DL (ref 0.51–0.95)
CRP SERPL-MCNC: 8.07 MG/L
EGFRCR SERPLBLD CKD-EPI 2021: >90 ML/MIN/1.73M2
EOSINOPHIL # BLD AUTO: 0.1 10E3/UL (ref 0–0.7)
EOSINOPHIL NFR BLD AUTO: 3 %
ERYTHROCYTE [DISTWIDTH] IN BLOOD BY AUTOMATED COUNT: 13.5 % (ref 10–15)
HCT VFR BLD AUTO: 37.5 % (ref 35–47)
HGB BLD-MCNC: 12.2 G/DL (ref 11.7–15.7)
IMM GRANULOCYTES # BLD: 0 10E3/UL
IMM GRANULOCYTES NFR BLD: 0 %
LYMPHOCYTES # BLD AUTO: 1.9 10E3/UL (ref 0.8–5.3)
LYMPHOCYTES NFR BLD AUTO: 42 %
MCH RBC QN AUTO: 29.6 PG (ref 26.5–33)
MCHC RBC AUTO-ENTMCNC: 32.5 G/DL (ref 31.5–36.5)
MCV RBC AUTO: 91 FL (ref 78–100)
MONOCYTES # BLD AUTO: 0.4 10E3/UL (ref 0–1.3)
MONOCYTES NFR BLD AUTO: 9 %
NEUTROPHILS # BLD AUTO: 2.1 10E3/UL (ref 1.6–8.3)
NEUTROPHILS NFR BLD AUTO: 45 %
NRBC # BLD AUTO: 0 10E3/UL
NRBC BLD AUTO-RTO: 0 /100
PLATELET # BLD AUTO: 281 10E3/UL (ref 150–450)
RBC # BLD AUTO: 4.12 10E6/UL (ref 3.8–5.2)
VANCOMYCIN SERPL-MCNC: 12.3 UG/ML
WBC # BLD AUTO: 4.5 10E3/UL (ref 4–11)

## 2024-01-04 PROCEDURE — 82565 ASSAY OF CREATININE: CPT | Performed by: PHYSICIAN ASSISTANT

## 2024-01-04 PROCEDURE — 80202 ASSAY OF VANCOMYCIN: CPT | Performed by: PHYSICIAN ASSISTANT

## 2024-01-04 PROCEDURE — 86140 C-REACTIVE PROTEIN: CPT | Performed by: PHYSICIAN ASSISTANT

## 2024-01-04 PROCEDURE — 85025 COMPLETE CBC W/AUTO DIFF WBC: CPT | Performed by: PHYSICIAN ASSISTANT

## 2024-01-04 PROCEDURE — 84450 TRANSFERASE (AST) (SGOT): CPT | Performed by: PHYSICIAN ASSISTANT

## 2024-01-08 ENCOUNTER — LAB REQUISITION (OUTPATIENT)
Dept: LAB | Facility: CLINIC | Age: 29
End: 2024-01-08

## 2024-01-08 DIAGNOSIS — L03.113 CELLULITIS OF RIGHT UPPER LIMB: ICD-10-CM

## 2024-01-08 LAB
CREAT SERPL-MCNC: 0.58 MG/DL (ref 0.51–0.95)
EGFRCR SERPLBLD CKD-EPI 2021: >90 ML/MIN/1.73M2
VANCOMYCIN SERPL-MCNC: 18.5 UG/ML

## 2024-01-08 PROCEDURE — 82565 ASSAY OF CREATININE: CPT | Performed by: PHYSICIAN ASSISTANT

## 2024-01-08 PROCEDURE — 80202 ASSAY OF VANCOMYCIN: CPT | Performed by: PHYSICIAN ASSISTANT

## 2024-01-11 ENCOUNTER — LAB REQUISITION (OUTPATIENT)
Dept: LAB | Facility: CLINIC | Age: 29
End: 2024-01-11

## 2024-01-11 DIAGNOSIS — L03.113 CELLULITIS OF RIGHT UPPER LIMB: ICD-10-CM

## 2024-01-11 LAB
AST SERPL W P-5'-P-CCNC: 31 U/L (ref 0–45)
BASOPHILS # BLD AUTO: 0 10E3/UL (ref 0–0.2)
BASOPHILS NFR BLD AUTO: 1 %
CREAT SERPL-MCNC: 0.58 MG/DL (ref 0.51–0.95)
CRP SERPL-MCNC: 6.08 MG/L
EGFRCR SERPLBLD CKD-EPI 2021: >90 ML/MIN/1.73M2
EOSINOPHIL # BLD AUTO: 0.2 10E3/UL (ref 0–0.7)
EOSINOPHIL NFR BLD AUTO: 6 %
ERYTHROCYTE [DISTWIDTH] IN BLOOD BY AUTOMATED COUNT: 14 % (ref 10–15)
HCT VFR BLD AUTO: 39.4 % (ref 35–47)
HGB BLD-MCNC: 13 G/DL (ref 11.7–15.7)
IMM GRANULOCYTES # BLD: 0 10E3/UL
IMM GRANULOCYTES NFR BLD: 0 %
LYMPHOCYTES # BLD AUTO: 1.6 10E3/UL (ref 0.8–5.3)
LYMPHOCYTES NFR BLD AUTO: 50 %
MCH RBC QN AUTO: 30 PG (ref 26.5–33)
MCHC RBC AUTO-ENTMCNC: 33 G/DL (ref 31.5–36.5)
MCV RBC AUTO: 91 FL (ref 78–100)
MONOCYTES # BLD AUTO: 0.3 10E3/UL (ref 0–1.3)
MONOCYTES NFR BLD AUTO: 10 %
NEUTROPHILS # BLD AUTO: 1 10E3/UL (ref 1.6–8.3)
NEUTROPHILS NFR BLD AUTO: 33 %
NRBC # BLD AUTO: 0 10E3/UL
NRBC BLD AUTO-RTO: 0 /100
PLATELET # BLD AUTO: 243 10E3/UL (ref 150–450)
RBC # BLD AUTO: 4.33 10E6/UL (ref 3.8–5.2)
VANCOMYCIN SERPL-MCNC: 7.9 UG/ML
WBC # BLD AUTO: 3.2 10E3/UL (ref 4–11)

## 2024-01-11 PROCEDURE — 80202 ASSAY OF VANCOMYCIN: CPT | Performed by: PHYSICIAN ASSISTANT

## 2024-01-11 PROCEDURE — 82565 ASSAY OF CREATININE: CPT | Performed by: PHYSICIAN ASSISTANT

## 2024-01-11 PROCEDURE — 84450 TRANSFERASE (AST) (SGOT): CPT | Performed by: PHYSICIAN ASSISTANT

## 2024-01-11 PROCEDURE — 86140 C-REACTIVE PROTEIN: CPT | Performed by: PHYSICIAN ASSISTANT

## 2024-01-11 PROCEDURE — 85025 COMPLETE CBC W/AUTO DIFF WBC: CPT | Performed by: PHYSICIAN ASSISTANT

## 2024-01-12 ENCOUNTER — MEDICAL CORRESPONDENCE (OUTPATIENT)
Dept: HEALTH INFORMATION MANAGEMENT | Facility: CLINIC | Age: 29
End: 2024-01-12

## 2024-01-12 ENCOUNTER — TRANSCRIBE ORDERS (OUTPATIENT)
Dept: INFUSION THERAPY | Facility: CLINIC | Age: 29
End: 2024-01-12

## 2024-01-12 ENCOUNTER — INFUSION THERAPY VISIT (OUTPATIENT)
Dept: INFUSION THERAPY | Facility: CLINIC | Age: 29
End: 2024-01-12
Attending: PHYSICIAN ASSISTANT

## 2024-01-12 VITALS
SYSTOLIC BLOOD PRESSURE: 120 MMHG | TEMPERATURE: 97.3 F | HEART RATE: 69 BPM | OXYGEN SATURATION: 100 % | DIASTOLIC BLOOD PRESSURE: 77 MMHG

## 2024-01-12 DIAGNOSIS — L02.519 HAND ABSCESS: Primary | ICD-10-CM

## 2024-01-12 PROCEDURE — 96374 THER/PROPH/DIAG INJ IV PUSH: CPT

## 2024-01-12 PROCEDURE — 250N000011 HC RX IP 250 OP 636: Performed by: INTERNAL MEDICINE

## 2024-01-12 RX ORDER — HEPARIN SODIUM (PORCINE) LOCK FLUSH IV SOLN 100 UNIT/ML 100 UNIT/ML
5 SOLUTION INTRAVENOUS
OUTPATIENT
Start: 2024-01-12

## 2024-01-12 RX ORDER — HEPARIN SODIUM,PORCINE 10 UNIT/ML
5-20 VIAL (ML) INTRAVENOUS DAILY PRN
OUTPATIENT
Start: 2024-01-12

## 2024-01-12 RX ORDER — HEPARIN SODIUM,PORCINE 10 UNIT/ML
5-20 VIAL (ML) INTRAVENOUS DAILY PRN
Status: CANCELLED | OUTPATIENT
Start: 2024-01-12

## 2024-01-12 RX ORDER — DAPTOMYCIN 50 MG/ML
4 INJECTION, POWDER, LYOPHILIZED, FOR SOLUTION INTRAVENOUS ONCE
OUTPATIENT
Start: 2024-01-12 | End: 2024-01-12

## 2024-01-12 RX ORDER — DAPTOMYCIN 50 MG/ML
4 INJECTION, POWDER, LYOPHILIZED, FOR SOLUTION INTRAVENOUS ONCE
Status: COMPLETED | OUTPATIENT
Start: 2024-01-12 | End: 2024-01-12

## 2024-01-12 RX ORDER — HEPARIN SODIUM (PORCINE) LOCK FLUSH IV SOLN 100 UNIT/ML 100 UNIT/ML
5 SOLUTION INTRAVENOUS
Status: CANCELLED | OUTPATIENT
Start: 2024-01-12

## 2024-01-12 RX ORDER — DAPTOMYCIN 50 MG/ML
4 INJECTION, POWDER, LYOPHILIZED, FOR SOLUTION INTRAVENOUS ONCE
Status: CANCELLED | OUTPATIENT
Start: 2024-01-12 | End: 2024-01-12

## 2024-01-12 RX ADMIN — DAPTOMYCIN 250 MG: 50 INJECTION, POWDER, LYOPHILIZED, FOR SOLUTION INTRAVENOUS at 14:05

## 2024-01-12 NOTE — PROGRESS NOTES
Infusion Nursing Note:  Shweta Hilton presents today for 1st dose IV Daptomycin.    Patient seen by provider today: No   present during visit today: Not Applicable.    Note: Pt has been receiving IV Vanco at home since hospital discharge on 12/31/23 for right hand cellulitis/abscess, thought to likely be secondary to spider bite  Will be transitioning to IV Daptomycin for 7 days. Per pt, will be switching antibiotics due to Vancomycin causing a decrease in her WBC below the normal range  Reports she tolerated the Vancomycin if given slowly, otherwise developed dizziness/itchiness    Approx 3 minutes post IVP Daptomycin, pt noted dizziness and mild nausea.  BP remained at baseline. Drank water and continued to be observed for 15 minutes. Symptoms resolved and pt discharged to home  Notified [facility] about incident. Will contact pt for further infusions at home      Intravenous Access:  PICC.    Treatment Conditions:  Not Applicable.      Post Infusion Assessment:  Patient tolerated infusion without incident.  Blood return noted pre and post infusion.  Site patent and intact, free from redness, edema or discomfort.  No evidence of extravasations.       Discharge Plan:   Patient discharged in stable condition accompanied by: friend.  Departure Mode: Ambulatory.      Glenny Mishra RN

## 2024-01-15 ENCOUNTER — LAB REQUISITION (OUTPATIENT)
Dept: LAB | Facility: CLINIC | Age: 29
End: 2024-01-15

## 2024-01-15 DIAGNOSIS — L03.113 CELLULITIS OF RIGHT UPPER LIMB: ICD-10-CM

## 2024-01-15 LAB
AST SERPL W P-5'-P-CCNC: 73 U/L (ref 0–45)
BASOPHILS # BLD AUTO: 0 10E3/UL (ref 0–0.2)
BASOPHILS NFR BLD AUTO: 1 %
CK SERPL-CCNC: 51 U/L (ref 26–192)
CREAT SERPL-MCNC: 0.68 MG/DL (ref 0.51–0.95)
CRP SERPL-MCNC: 3.11 MG/L
EGFRCR SERPLBLD CKD-EPI 2021: >90 ML/MIN/1.73M2
EOSINOPHIL # BLD AUTO: 0.2 10E3/UL (ref 0–0.7)
EOSINOPHIL NFR BLD AUTO: 6 %
ERYTHROCYTE [DISTWIDTH] IN BLOOD BY AUTOMATED COUNT: 13.8 % (ref 10–15)
HCT VFR BLD AUTO: 41.1 % (ref 35–47)
HGB BLD-MCNC: 13.7 G/DL (ref 11.7–15.7)
HOLD SPECIMEN: NORMAL
IMM GRANULOCYTES # BLD: 0 10E3/UL
IMM GRANULOCYTES NFR BLD: 0 %
LYMPHOCYTES # BLD AUTO: 1.6 10E3/UL (ref 0.8–5.3)
LYMPHOCYTES NFR BLD AUTO: 49 %
MCH RBC QN AUTO: 30.3 PG (ref 26.5–33)
MCHC RBC AUTO-ENTMCNC: 33.3 G/DL (ref 31.5–36.5)
MCV RBC AUTO: 91 FL (ref 78–100)
MONOCYTES # BLD AUTO: 0.2 10E3/UL (ref 0–1.3)
MONOCYTES NFR BLD AUTO: 7 %
NEUTROPHILS # BLD AUTO: 1.2 10E3/UL (ref 1.6–8.3)
NEUTROPHILS NFR BLD AUTO: 37 %
NRBC # BLD AUTO: 0 10E3/UL
NRBC BLD AUTO-RTO: 0 /100
PLATELET # BLD AUTO: 274 10E3/UL (ref 150–450)
RBC # BLD AUTO: 4.52 10E6/UL (ref 3.8–5.2)
WBC # BLD AUTO: 3.2 10E3/UL (ref 4–11)

## 2024-01-15 PROCEDURE — 82550 ASSAY OF CK (CPK): CPT | Performed by: PHYSICIAN ASSISTANT

## 2024-01-15 PROCEDURE — 82565 ASSAY OF CREATININE: CPT | Performed by: PHYSICIAN ASSISTANT

## 2024-01-15 PROCEDURE — 84450 TRANSFERASE (AST) (SGOT): CPT | Performed by: PHYSICIAN ASSISTANT

## 2024-01-15 PROCEDURE — 86140 C-REACTIVE PROTEIN: CPT | Performed by: PHYSICIAN ASSISTANT

## 2024-01-15 PROCEDURE — 85025 COMPLETE CBC W/AUTO DIFF WBC: CPT | Performed by: PHYSICIAN ASSISTANT

## 2024-01-24 LAB — BACTERIA ABSC ANAEROBE+AEROBE CULT: NO GROWTH

## 2024-07-07 ENCOUNTER — HOSPITAL ENCOUNTER (EMERGENCY)
Facility: CLINIC | Age: 29
Discharge: HOME | End: 2024-07-07
Attending: EMERGENCY MEDICINE | Admitting: EMERGENCY MEDICINE
Payer: MEDICAID

## 2024-07-07 VITALS
HEART RATE: 58 BPM | RESPIRATION RATE: 16 BRPM | OXYGEN SATURATION: 98 % | DIASTOLIC BLOOD PRESSURE: 68 MMHG | SYSTOLIC BLOOD PRESSURE: 108 MMHG | TEMPERATURE: 98.1 F

## 2024-07-07 DIAGNOSIS — L50.9 URTICARIA: ICD-10-CM

## 2024-07-07 PROCEDURE — 250N000013 HC RX MED GY IP 250 OP 250 PS 637: Performed by: EMERGENCY MEDICINE

## 2024-07-07 PROCEDURE — 99283 EMERGENCY DEPT VISIT LOW MDM: CPT

## 2024-07-07 RX ORDER — DIPHENHYDRAMINE HCL 25 MG
25 CAPSULE ORAL ONCE
Status: COMPLETED | OUTPATIENT
Start: 2024-07-07 | End: 2024-07-07

## 2024-07-07 RX ORDER — DIPHENHYDRAMINE HCL 25 MG
25 CAPSULE ORAL
Qty: 30 CAPSULE | Refills: 0 | Status: SHIPPED | OUTPATIENT
Start: 2024-07-07

## 2024-07-07 RX ORDER — DIPHENHYDRAMINE HYDROCHLORIDE 50 MG/ML
25 INJECTION INTRAMUSCULAR; INTRAVENOUS ONCE
Status: DISCONTINUED | OUTPATIENT
Start: 2024-07-07 | End: 2024-07-07

## 2024-07-07 RX ADMIN — DIPHENHYDRAMINE HYDROCHLORIDE 25 MG: 25 CAPSULE ORAL at 02:58

## 2024-07-07 ASSESSMENT — ACTIVITIES OF DAILY LIVING (ADL)
ADLS_ACUITY_SCORE: 35

## 2024-07-07 ASSESSMENT — COLUMBIA-SUICIDE SEVERITY RATING SCALE - C-SSRS
2. HAVE YOU ACTUALLY HAD ANY THOUGHTS OF KILLING YOURSELF IN THE PAST MONTH?: NO
1. IN THE PAST MONTH, HAVE YOU WISHED YOU WERE DEAD OR WISHED YOU COULD GO TO SLEEP AND NOT WAKE UP?: NO
6. HAVE YOU EVER DONE ANYTHING, STARTED TO DO ANYTHING, OR PREPARED TO DO ANYTHING TO END YOUR LIFE?: NO

## 2024-07-07 NOTE — ED TRIAGE NOTES
Patient arrives via EMS from home with complaints of an itchy rash that comes and goes on neck and chest. Boyfriend present said it is all over and warm. Currently does not feel like the rash is bad but is worse when she itches it.   Is 14 weeks pregnant.

## 2024-07-07 NOTE — ED NOTES
Bed: ED05  Expected date: 7/7/24  Expected time: 1:55 AM  Means of arrival: Ambulance  Comments:  T[address] 28F itchy rash   
appears older than stated age

## 2024-07-07 NOTE — ED PROVIDER NOTES
Emergency Department Note      History of Present Illness     Chief Complaint   Rash    HPI   Shweta Hilton is a 28 year old female who presents to the ER for rash. Patient reports allergic reaction starting 3 days ago with rash, full body itching, fatigue, and lightheadedness that has not improved with a cold shower. She also notes that her right hand swells with itching and she has been eating and drinking properly. Patient recalls eating a type of meat but can't remember which one before the reaction started.  She states she occasionally does have a similar reaction when eating meat.  No tick bites.  She had not taken any other medication including benadryl. Shweta is 14 weeks pregnant.     Review of External Notes   None     Past Medical History     Medical History and Problem List   Eye infection  Peptic ulcer    Medications   Hydroxyzine  Oxycodone  Pantoprazole  Senna-docusate    Surgical History      Debridement of right finger  Physical Exam     Patient Vitals for the past 24 hrs:   BP Temp Temp src Pulse Resp SpO2   24 0404 108/68 -- -- 58 16 98 %   24 0209 -- 98.1  F (36.7  C) Oral -- 18 98 %   24 0203 108/72 -- -- 61 -- --     Physical Exam  General: Resting on the gurney, appears comfortable.  Head:  The scalp, face, and head appear normal  Mouth/Throat: Mucus membranes are moist.  No intraoral or labial swelling/edema  CV:  Regular rate    Normal S1 and S2  No pathological murmur   Resp:  Breath sounds clear and equal bilaterally    Non-labored, no retractions or accessory muscle use    No coarseness    No wheezing   GI:  Abdomen is soft, no rigidity    No tenderness to palpation  MS:  Normal motor assessment of all extremities.    Good capillary refill noted.  Skin:  Urticarial rash most pronounced on the back of the neck and upper shoulders as well as right lateral thigh.  Neuro:   Speech is normal and fluent. No apparent deficit.  Psych: Awake. Alert.  Normal affect.       Appropriate interactions.      ED Course      Medications Administered   Medications   diphenhydrAMINE (BENADRYL) capsule 25 mg (25 mg Oral $Given 7/7/24 0258)       Discussion of Management   None    ED Course   ED Course as of 07/07/24 0405   Sun Jul 07, 2024   0230 I obtained the history and examined the patient as noted above.    0358 I rechecked patient and explained findings and plan of care.          Optional/Additional Documentation  None    Medical Decision Making / Diagnosis       RUBEN Hilton is a 28 year old female who presents for evaluation of rash.  This is likely consistent with allergic phenomena. This appears to be at this point an isolated rash without signs of angioedema, respiratory compromise, shock, serious systemic reaction, etc. No signs of serious infection, cellulitis, vasculitis, or other skin manifestation of a serious underlying disease.  Supportive outpatient management is indicated, medications for discharge noted above. Close followup with primary care physician. Return if  wheezing, progressive shortness of breath, facial or throat/tongue swelling.       Disposition   The patient was discharged.     Diagnosis     ICD-10-CM    1. Urticaria  L50.9            Discharge Medications   New Prescriptions    DIPHENHYDRAMINE (BENADRYL) 25 MG CAPSULE    Take 1 capsule (25 mg) by mouth nightly as needed for itching or allergies         Scribe Disclosure:  I, Selvin Mcginnis, am serving as a scribe at 2:42 AM on 7/7/2024 to document services personally performed by Soumya Sibley MD based on my observations and the provider's statements to me.        Soumya Sibley MD  07/07/24 [id number]

## 2024-07-08 ENCOUNTER — PATIENT OUTREACH (OUTPATIENT)
Dept: INTERNAL MEDICINE | Facility: CLINIC | Age: 29
End: 2024-07-08
Payer: MEDICAID

## 2024-07-09 NOTE — TELEPHONE ENCOUNTER
Patient Contact    Attempt # 1    Was call answered?  No.  Left message on voicemail with information to call back.     Stephanie Luna RN

## 2024-07-10 NOTE — TELEPHONE ENCOUNTER
Per chart review, pt has not been seen at the [facility].   Closing encounter.    Thank you,  Mehnaz Jyo RN

## (undated) DEVICE — BNDG ELASTIC 4"X5YDS UNSTERILE 6611-40

## (undated) DEVICE — SU ETHILON 3-0 FS-1 18" 669H

## (undated) DEVICE — SYR BULB IRRIG 50ML LATEX FREE 0035280

## (undated) DEVICE — PACK HAND WRIST SOP15HWFSP

## (undated) DEVICE — DRSG KERLIX 4 1/2"X4YDS ROLL 6715

## (undated) DEVICE — BNDG ELASTIC 2"X5YDS UNSTERILE 6611-20

## (undated) DEVICE — GLOVE BIOGEL PI MICRO INDICATOR UNDERGLOVE SZ 6.5 48965

## (undated) DEVICE — BNDG KLING 3" 2232

## (undated) DEVICE — SOL WATER IRRIG 1000ML BOTTLE 2F7114

## (undated) DEVICE — DRSG KERLIX FLUFFS X5

## (undated) DEVICE — VESSEL LOOPS YELLOW MINI 31145660

## (undated) DEVICE — CAST PADDING 4" STERILE 9044S

## (undated) DEVICE — SOL ADH LIQUID BENZOIN SWAB 0.6ML C1544

## (undated) DEVICE — SU CHROMIC 4-0 P-3 18" 1654G

## (undated) DEVICE — GLOVE LINER COTTON MED 32-2076

## (undated) DEVICE — GLOVE SENSICARE GREEN PF 7.5 LATEX FREE MSG1275

## (undated) DEVICE — LINEN TOWEL PACK X5 5464

## (undated) DEVICE — DRSG STERI STRIP 1/2X4" R1547

## (undated) DEVICE — SOL NACL 0.9% IRRIG 1000ML BOTTLE 2F7124

## (undated) DEVICE — DECANTER BAG 2002S

## (undated) DEVICE — SU ETHILON 4-0 P-3 18" 699H

## (undated) DEVICE — CAST PLASTER SPLINT 3X15" 7393

## (undated) DEVICE — GLOVE BIOGEL PI MICRO SZ 6.5 48565

## (undated) DEVICE — SYR 10ML FINGER CONTROL W/O NDL 309695

## (undated) RX ORDER — ACETAMINOPHEN 160 MG
TABLET,DISINTEGRATING ORAL
Status: DISPENSED
Start: 2023-12-26

## (undated) RX ORDER — FENTANYL CITRATE 50 UG/ML
INJECTION, SOLUTION INTRAMUSCULAR; INTRAVENOUS
Status: DISPENSED
Start: 2023-12-26

## (undated) RX ORDER — PROPOFOL 10 MG/ML
INJECTION, EMULSION INTRAVENOUS
Status: DISPENSED
Start: 2023-12-26

## (undated) RX ORDER — KETOROLAC TROMETHAMINE 30 MG/ML
INJECTION, SOLUTION INTRAMUSCULAR; INTRAVENOUS
Status: DISPENSED
Start: 2023-12-26

## (undated) RX ORDER — CEFAZOLIN SODIUM/WATER 2 G/20 ML
SYRINGE (ML) INTRAVENOUS
Status: DISPENSED
Start: 2023-12-26

## (undated) RX ORDER — HYDROMORPHONE HYDROCHLORIDE 1 MG/ML
INJECTION, SOLUTION INTRAMUSCULAR; INTRAVENOUS; SUBCUTANEOUS
Status: DISPENSED
Start: 2023-12-26